# Patient Record
Sex: MALE | Race: WHITE | NOT HISPANIC OR LATINO | Employment: FULL TIME | RURAL
[De-identification: names, ages, dates, MRNs, and addresses within clinical notes are randomized per-mention and may not be internally consistent; named-entity substitution may affect disease eponyms.]

---

## 2018-06-28 LAB — CRC RECOMMENDATION EXT: NORMAL

## 2020-03-16 ENCOUNTER — HISTORICAL (OUTPATIENT)
Dept: ADMINISTRATIVE | Facility: HOSPITAL | Age: 60
End: 2020-03-16

## 2020-03-16 LAB
ALBUMIN SERPL BCP-MCNC: 3.6 G/DL (ref 3.5–5)
ALBUMIN/GLOB SERPL: 0.8 {RATIO}
ALP SERPL-CCNC: 76 U/L (ref 45–115)
ALT SERPL W P-5'-P-CCNC: 48 U/L (ref 16–61)
AST SERPL W P-5'-P-CCNC: 49 U/L (ref 15–37)
BASOPHILS # BLD AUTO: 0.05 X10E3/UL (ref 0–0.2)
BASOPHILS NFR BLD AUTO: 0.4 % (ref 0–1)
BILIRUB SERPL-MCNC: 0.5 MG/DL (ref 0–1.2)
BUN SERPL-MCNC: 23 MG/DL (ref 7–18)
BUN/CREAT SERPL: 20
CALCIUM SERPL-MCNC: 9 MG/DL (ref 8.5–10.1)
CHLORIDE SERPL-SCNC: 104 MMOL/L (ref 98–107)
CO2 SERPL-SCNC: 26 MMOL/L (ref 21–32)
CREAT SERPL-MCNC: 1.16 MG/DL (ref 0.7–1.3)
EOSINOPHIL # BLD AUTO: 0.07 X10E3/UL (ref 0–0.5)
EOSINOPHIL NFR BLD AUTO: 0.6 % (ref 1–4)
ERYTHROCYTE [DISTWIDTH] IN BLOOD BY AUTOMATED COUNT: 14.9 % (ref 11.5–14.5)
GLOBULIN SER-MCNC: 4.5 G/DL (ref 2–4)
GLUCOSE SERPL-MCNC: 106 MG/DL (ref 74–106)
HCT VFR BLD AUTO: 43.9 % (ref 40–54)
HGB BLD-MCNC: 13.9 G/DL (ref 13.5–18)
IMM GRANULOCYTES # BLD AUTO: 0.04 X10E3/UL (ref 0–0.04)
IMM GRANULOCYTES NFR BLD: 0.3 % (ref 0–0.4)
LYMPHOCYTES # BLD AUTO: 1.22 X10E3/UL (ref 1–4.8)
LYMPHOCYTES NFR BLD AUTO: 10.6 % (ref 27–41)
MCH RBC QN AUTO: 29.8 PG (ref 27–31)
MCHC RBC AUTO-ENTMCNC: 31.7 G/DL (ref 32–36)
MCV RBC AUTO: 94 FL (ref 80–96)
MONOCYTES # BLD AUTO: 0.32 X10E3/UL (ref 0–0.8)
MONOCYTES NFR BLD AUTO: 2.8 % (ref 2–6)
MPC BLD CALC-MCNC: 9.9 FL (ref 9.4–12.4)
NEUTROPHILS # BLD AUTO: 9.8 X10E3/UL (ref 1.8–7.7)
NEUTROPHILS NFR BLD AUTO: 85.3 % (ref 53–65)
NRBC # BLD AUTO: 0 X10E3/UL (ref 0–0)
NRBC, AUTO (.00): 0 /100 (ref 0–0)
PLATELET # BLD AUTO: 280 X10E3/UL (ref 150–400)
POTASSIUM SERPL-SCNC: 4.4 MMOL/L (ref 3.5–5.1)
PROT SERPL-MCNC: 8.1 G/DL (ref 6.4–8.2)
RBC # BLD AUTO: 4.67 X10E6/UL (ref 4.6–6.2)
SODIUM SERPL-SCNC: 135 MMOL/L (ref 136–145)
WBC # BLD AUTO: 11.5 X10E3/UL (ref 4.5–11)

## 2020-05-18 ENCOUNTER — HISTORICAL (OUTPATIENT)
Dept: ADMINISTRATIVE | Facility: HOSPITAL | Age: 60
End: 2020-05-18

## 2020-08-21 ENCOUNTER — HISTORICAL (OUTPATIENT)
Dept: ADMINISTRATIVE | Facility: HOSPITAL | Age: 60
End: 2020-08-21

## 2020-08-22 LAB
ALT SERPL W P-5'-P-CCNC: 30 U/L (ref 16–61)
AST SERPL W P-5'-P-CCNC: 26 U/L (ref 15–37)
BASOPHILS # BLD AUTO: 0.08 X10E3/UL (ref 0–0.2)
BASOPHILS NFR BLD AUTO: 0.6 % (ref 0–1)
CREAT SERPL-MCNC: 1.25 MG/DL (ref 0.7–1.3)
EOSINOPHIL # BLD AUTO: 0.21 X10E3/UL (ref 0–0.5)
EOSINOPHIL NFR BLD AUTO: 1.6 % (ref 1–4)
ERYTHROCYTE [DISTWIDTH] IN BLOOD BY AUTOMATED COUNT: 14.8 % (ref 11.5–14.5)
HCT VFR BLD AUTO: 44.3 % (ref 40–54)
HGB BLD-MCNC: 14.6 G/DL (ref 13.5–18)
IMM GRANULOCYTES # BLD AUTO: 0.05 X10E3/UL (ref 0–0.04)
IMM GRANULOCYTES NFR BLD: 0.4 % (ref 0–0.4)
LYMPHOCYTES # BLD AUTO: 2.18 X10E3/UL (ref 1–4.8)
LYMPHOCYTES NFR BLD AUTO: 16.1 % (ref 27–41)
MCH RBC QN AUTO: 32.5 PG (ref 27–31)
MCHC RBC AUTO-ENTMCNC: 33 G/DL (ref 32–36)
MCV RBC AUTO: 98.7 FL (ref 80–96)
MONOCYTES # BLD AUTO: 0.89 X10E3/UL (ref 0–0.8)
MONOCYTES NFR BLD AUTO: 6.6 % (ref 2–6)
MPC BLD CALC-MCNC: 10.4 FL (ref 9.4–12.4)
NEUTROPHILS # BLD AUTO: 10.13 X10E3/UL (ref 1.8–7.7)
NEUTROPHILS NFR BLD AUTO: 74.7 % (ref 53–65)
NRBC # BLD AUTO: 0 X10E3/UL (ref 0–0)
NRBC, AUTO (.00): 0 /100 (ref 0–0)
PLATELET # BLD AUTO: 312 X10E3/UL (ref 150–400)
RBC # BLD AUTO: 4.49 X10E6/UL (ref 4.6–6.2)
WBC # BLD AUTO: 13.54 X10E3/UL (ref 4.5–11)

## 2020-10-15 ENCOUNTER — HISTORICAL (OUTPATIENT)
Dept: ADMINISTRATIVE | Facility: HOSPITAL | Age: 60
End: 2020-10-15

## 2020-10-15 LAB
AMPHET UR QL SCN: NEGATIVE
BARBITURATES UR QL SCN: NEGATIVE
BENZODIAZ METAB UR QL SCN: NEGATIVE
BUPRENORPHINE UR QL SCN: POSITIVE
COCAINE UR QL SCN: NEGATIVE
MDA UR QL SCN: NEGATIVE
METHADONE UR QL SCN: NEGATIVE
METHAMPHET UR QL SCN: NEGATIVE
OPIATES UR QL SCN: NEGATIVE
OXYCODONE UR QL SCN: NEGATIVE
PCP UR QL SCN: NEGATIVE
TEMPERATURE, URINE: 92 (ref 90–100)
THC UR QL SCN: NEGATIVE
VALIDITY TESTS, URINE: ABNORMAL

## 2021-03-24 ENCOUNTER — OFFICE VISIT (OUTPATIENT)
Dept: DERMATOLOGY | Facility: CLINIC | Age: 61
End: 2021-03-24
Payer: COMMERCIAL

## 2021-03-24 VITALS — BODY MASS INDEX: 27.77 KG/M2 | RESPIRATION RATE: 18 BRPM | WEIGHT: 205 LBS | HEIGHT: 72 IN

## 2021-03-24 DIAGNOSIS — L73.2 HIDRADENITIS SUPPURATIVA: Primary | ICD-10-CM

## 2021-03-24 DIAGNOSIS — Z79.899 HIGH RISK MEDICATION USE: ICD-10-CM

## 2021-03-24 PROCEDURE — 99214 OFFICE O/P EST MOD 30 MIN: CPT | Mod: ,,, | Performed by: DERMATOLOGY

## 2021-03-24 PROCEDURE — 99214 PR OFFICE/OUTPT VISIT, EST, LEVL IV, 30-39 MIN: ICD-10-PCS | Mod: ,,, | Performed by: DERMATOLOGY

## 2021-03-24 RX ORDER — FOLIC ACID 1 MG/1
1 TABLET ORAL DAILY
COMMUNITY
Start: 2021-03-16 | End: 2021-06-21 | Stop reason: SDUPTHER

## 2021-03-24 RX ORDER — HYDROXYCHLOROQUINE SULFATE 200 MG/1
200 TABLET, FILM COATED ORAL 2 TIMES DAILY
COMMUNITY
Start: 2021-03-13 | End: 2021-07-12 | Stop reason: SDUPTHER

## 2021-03-24 RX ORDER — BUPRENORPHINE AND NALOXONE 8; 2 MG/1; MG/1
0.5 FILM, SOLUBLE BUCCAL; SUBLINGUAL DAILY
COMMUNITY
Start: 2021-03-17

## 2021-03-24 RX ORDER — PREDNISONE 5 MG/1
5 TABLET ORAL DAILY
COMMUNITY
Start: 2021-03-06 | End: 2022-09-07 | Stop reason: SDUPTHER

## 2021-03-24 RX ORDER — ADALIMUMAB 40MG/0.4ML
KIT SUBCUTANEOUS
COMMUNITY
Start: 2021-03-10

## 2021-03-24 RX ORDER — METHOTREXATE 2.5 MG/1
TABLET ORAL
COMMUNITY
Start: 2021-03-16 | End: 2022-04-04 | Stop reason: SDUPTHER

## 2021-03-24 RX ORDER — OMEPRAZOLE 20 MG/1
20 CAPSULE, DELAYED RELEASE ORAL DAILY
COMMUNITY
Start: 2021-03-17

## 2021-03-24 RX ORDER — AMLODIPINE AND BENAZEPRIL HYDROCHLORIDE 10; 40 MG/1; MG/1
1 CAPSULE ORAL DAILY
COMMUNITY
Start: 2021-03-13 | End: 2021-08-25

## 2021-03-24 RX ORDER — MUPIROCIN 20 MG/G
OINTMENT TOPICAL
Qty: 30 G | Refills: 2 | Status: SHIPPED | OUTPATIENT
Start: 2021-03-24 | End: 2022-04-04 | Stop reason: ALTCHOICE

## 2021-03-24 RX ORDER — TAMSULOSIN HYDROCHLORIDE 0.4 MG/1
1 CAPSULE ORAL DAILY
COMMUNITY
Start: 2021-03-13 | End: 2021-08-25

## 2021-05-24 ENCOUNTER — TELEPHONE (OUTPATIENT)
Dept: FAMILY MEDICINE | Facility: CLINIC | Age: 61
End: 2021-05-24

## 2021-06-18 RX ORDER — GABAPENTIN 300 MG/1
300 CAPSULE ORAL 2 TIMES DAILY
COMMUNITY
End: 2021-06-21 | Stop reason: SDUPTHER

## 2021-06-18 RX ORDER — ACETAMINOPHEN 500 MG
500-1000 TABLET ORAL EVERY 6 HOURS PRN
COMMUNITY

## 2021-06-21 ENCOUNTER — OFFICE VISIT (OUTPATIENT)
Dept: FAMILY MEDICINE | Facility: CLINIC | Age: 61
End: 2021-06-21
Payer: COMMERCIAL

## 2021-06-21 VITALS
HEART RATE: 101 BPM | RESPIRATION RATE: 16 BRPM | OXYGEN SATURATION: 91 % | SYSTOLIC BLOOD PRESSURE: 108 MMHG | WEIGHT: 219.38 LBS | TEMPERATURE: 98 F | BODY MASS INDEX: 29.76 KG/M2 | DIASTOLIC BLOOD PRESSURE: 66 MMHG

## 2021-06-21 DIAGNOSIS — L73.2 HIDRADENITIS SUPPURATIVA: ICD-10-CM

## 2021-06-21 DIAGNOSIS — I10 ESSENTIAL HYPERTENSION: ICD-10-CM

## 2021-06-21 DIAGNOSIS — M06.9 RHEUMATOID ARTHRITIS INVOLVING MULTIPLE SITES, UNSPECIFIED WHETHER RHEUMATOID FACTOR PRESENT: Primary | ICD-10-CM

## 2021-06-21 PROCEDURE — 1126F PR PAIN SEVERITY QUANTIFIED, NO PAIN PRESENT: ICD-10-PCS | Mod: ,,, | Performed by: INTERNAL MEDICINE

## 2021-06-21 PROCEDURE — 99213 PR OFFICE/OUTPT VISIT, EST, LEVL III, 20-29 MIN: ICD-10-PCS | Mod: ,,, | Performed by: INTERNAL MEDICINE

## 2021-06-21 PROCEDURE — 99213 OFFICE O/P EST LOW 20 MIN: CPT | Mod: ,,, | Performed by: INTERNAL MEDICINE

## 2021-06-21 PROCEDURE — 3008F PR BODY MASS INDEX (BMI) DOCUMENTED: ICD-10-PCS | Mod: CPTII,,, | Performed by: INTERNAL MEDICINE

## 2021-06-21 PROCEDURE — 1126F AMNT PAIN NOTED NONE PRSNT: CPT | Mod: ,,, | Performed by: INTERNAL MEDICINE

## 2021-06-21 PROCEDURE — 3008F BODY MASS INDEX DOCD: CPT | Mod: CPTII,,, | Performed by: INTERNAL MEDICINE

## 2021-06-21 RX ORDER — GABAPENTIN 300 MG/1
300 CAPSULE ORAL 2 TIMES DAILY
Qty: 180 CAPSULE | Refills: 1 | Status: SHIPPED | OUTPATIENT
Start: 2021-06-21 | End: 2021-08-25

## 2021-06-21 RX ORDER — FOLIC ACID 1 MG/1
1 TABLET ORAL DAILY
Qty: 90 TABLET | Refills: 1 | Status: SHIPPED | OUTPATIENT
Start: 2021-06-21 | End: 2021-08-25

## 2021-07-12 ENCOUNTER — OFFICE VISIT (OUTPATIENT)
Dept: FAMILY MEDICINE | Facility: CLINIC | Age: 61
End: 2021-07-12
Payer: COMMERCIAL

## 2021-07-12 ENCOUNTER — HOSPITAL ENCOUNTER (OUTPATIENT)
Dept: RADIOLOGY | Facility: HOSPITAL | Age: 61
Discharge: HOME | End: 2021-07-12
Attending: INTERNAL MEDICINE
Payer: COMMERCIAL

## 2021-07-12 VITALS
BODY MASS INDEX: 29.97 KG/M2 | RESPIRATION RATE: 16 BRPM | TEMPERATURE: 98 F | DIASTOLIC BLOOD PRESSURE: 62 MMHG | HEART RATE: 104 BPM | SYSTOLIC BLOOD PRESSURE: 110 MMHG | OXYGEN SATURATION: 91 % | WEIGHT: 221 LBS

## 2021-07-12 DIAGNOSIS — G89.29 CHRONIC PAIN OF RIGHT KNEE: ICD-10-CM

## 2021-07-12 DIAGNOSIS — M25.561 CHRONIC PAIN OF RIGHT KNEE: ICD-10-CM

## 2021-07-12 DIAGNOSIS — M06.9 RHEUMATOID ARTHRITIS INVOLVING MULTIPLE SITES, UNSPECIFIED WHETHER RHEUMATOID FACTOR PRESENT: Primary | ICD-10-CM

## 2021-07-12 PROCEDURE — 1126F PR PAIN SEVERITY QUANTIFIED, NO PAIN PRESENT: ICD-10-PCS | Mod: ,,, | Performed by: INTERNAL MEDICINE

## 2021-07-12 PROCEDURE — 3008F BODY MASS INDEX DOCD: CPT | Mod: CPTII,,, | Performed by: INTERNAL MEDICINE

## 2021-07-12 PROCEDURE — 99214 OFFICE O/P EST MOD 30 MIN: CPT | Mod: ,,, | Performed by: INTERNAL MEDICINE

## 2021-07-12 PROCEDURE — 3008F PR BODY MASS INDEX (BMI) DOCUMENTED: ICD-10-PCS | Mod: CPTII,,, | Performed by: INTERNAL MEDICINE

## 2021-07-12 PROCEDURE — 73560 X-RAY EXAM OF KNEE 1 OR 2: CPT | Mod: TC,RT

## 2021-07-12 PROCEDURE — 99214 PR OFFICE/OUTPT VISIT, EST, LEVL IV, 30-39 MIN: ICD-10-PCS | Mod: ,,, | Performed by: INTERNAL MEDICINE

## 2021-07-12 PROCEDURE — 1126F AMNT PAIN NOTED NONE PRSNT: CPT | Mod: ,,, | Performed by: INTERNAL MEDICINE

## 2021-07-12 RX ORDER — HYDROXYCHLOROQUINE SULFATE 200 MG/1
200 TABLET, FILM COATED ORAL 2 TIMES DAILY
Qty: 180 TABLET | Refills: 1 | Status: SHIPPED | OUTPATIENT
Start: 2021-07-12 | End: 2021-10-05

## 2021-07-26 PROBLEM — M17.11 ARTHRITIS OF RIGHT KNEE: Status: ACTIVE | Noted: 2021-07-26

## 2021-09-23 ENCOUNTER — HOSPITAL ENCOUNTER (OUTPATIENT)
Dept: RADIOLOGY | Facility: HOSPITAL | Age: 61
Discharge: HOME | End: 2021-09-23
Attending: INTERNAL MEDICINE
Payer: COMMERCIAL

## 2021-09-23 ENCOUNTER — OFFICE VISIT (OUTPATIENT)
Dept: FAMILY MEDICINE | Facility: CLINIC | Age: 61
End: 2021-09-23
Payer: COMMERCIAL

## 2021-09-23 VITALS
BODY MASS INDEX: 29.7 KG/M2 | OXYGEN SATURATION: 94 % | HEART RATE: 96 BPM | TEMPERATURE: 98 F | SYSTOLIC BLOOD PRESSURE: 114 MMHG | DIASTOLIC BLOOD PRESSURE: 62 MMHG | RESPIRATION RATE: 16 BRPM | WEIGHT: 219 LBS

## 2021-09-23 DIAGNOSIS — Z01.818 PREOPERATIVE CLEARANCE: Primary | ICD-10-CM

## 2021-09-23 DIAGNOSIS — M06.9 RHEUMATOID ARTHRITIS INVOLVING MULTIPLE SITES, UNSPECIFIED WHETHER RHEUMATOID FACTOR PRESENT: ICD-10-CM

## 2021-09-23 DIAGNOSIS — Z01.818 PREOPERATIVE CLEARANCE: ICD-10-CM

## 2021-09-23 LAB
ANION GAP SERPL CALCULATED.3IONS-SCNC: 13 MMOL/L (ref 7–16)
BASOPHILS # BLD AUTO: 0.07 K/UL (ref 0–0.2)
BASOPHILS NFR BLD AUTO: 0.6 % (ref 0–1)
BUN SERPL-MCNC: 27 MG/DL (ref 7–18)
BUN/CREAT SERPL: 18 (ref 6–20)
CALCIUM SERPL-MCNC: 9.4 MG/DL (ref 8.5–10.1)
CHLORIDE SERPL-SCNC: 104 MMOL/L (ref 98–107)
CO2 SERPL-SCNC: 24 MMOL/L (ref 21–32)
CREAT SERPL-MCNC: 1.54 MG/DL (ref 0.7–1.3)
DIFFERENTIAL METHOD BLD: ABNORMAL
EOSINOPHIL # BLD AUTO: 0.13 K/UL (ref 0–0.5)
EOSINOPHIL NFR BLD AUTO: 1.2 % (ref 1–4)
ERYTHROCYTE [DISTWIDTH] IN BLOOD BY AUTOMATED COUNT: 14.5 % (ref 11.5–14.5)
GLUCOSE SERPL-MCNC: 103 MG/DL (ref 74–106)
HCT VFR BLD AUTO: 44.1 % (ref 40–54)
HGB BLD-MCNC: 14.7 G/DL (ref 13.5–18)
IMM GRANULOCYTES # BLD AUTO: 0.04 K/UL (ref 0–0.04)
IMM GRANULOCYTES NFR BLD: 0.4 % (ref 0–0.4)
LYMPHOCYTES # BLD AUTO: 1.71 K/UL (ref 1–4.8)
LYMPHOCYTES NFR BLD AUTO: 15.4 % (ref 27–41)
MCH RBC QN AUTO: 32.2 PG (ref 27–31)
MCHC RBC AUTO-ENTMCNC: 33.3 G/DL (ref 32–36)
MCV RBC AUTO: 96.7 FL (ref 80–96)
MONOCYTES # BLD AUTO: 0.61 K/UL (ref 0–0.8)
MONOCYTES NFR BLD AUTO: 5.5 % (ref 2–6)
MPC BLD CALC-MCNC: 10.4 FL (ref 9.4–12.4)
NEUTROPHILS # BLD AUTO: 8.55 K/UL (ref 1.8–7.7)
NEUTROPHILS NFR BLD AUTO: 76.9 % (ref 53–65)
NRBC # BLD AUTO: 0 X10E3/UL
NRBC, AUTO (.00): 0 %
PLATELET # BLD AUTO: 272 K/UL (ref 150–400)
POTASSIUM SERPL-SCNC: 4.8 MMOL/L (ref 3.5–5.1)
RBC # BLD AUTO: 4.56 M/UL (ref 4.6–6.2)
SODIUM SERPL-SCNC: 136 MMOL/L (ref 136–145)
WBC # BLD AUTO: 11.11 K/UL (ref 4.5–11)

## 2021-09-23 PROCEDURE — 3008F BODY MASS INDEX DOCD: CPT | Mod: CPTII,,, | Performed by: INTERNAL MEDICINE

## 2021-09-23 PROCEDURE — 1159F PR MEDICATION LIST DOCUMENTED IN MEDICAL RECORD: ICD-10-PCS | Mod: CPTII,,, | Performed by: INTERNAL MEDICINE

## 2021-09-23 PROCEDURE — 1160F RVW MEDS BY RX/DR IN RCRD: CPT | Mod: CPTII,,, | Performed by: INTERNAL MEDICINE

## 2021-09-23 PROCEDURE — 3078F DIAST BP <80 MM HG: CPT | Mod: CPTII,,, | Performed by: INTERNAL MEDICINE

## 2021-09-23 PROCEDURE — 4010F PR ACE/ARB THEARPY RXD/TAKEN: ICD-10-PCS | Mod: CPTII,,, | Performed by: INTERNAL MEDICINE

## 2021-09-23 PROCEDURE — 3008F PR BODY MASS INDEX (BMI) DOCUMENTED: ICD-10-PCS | Mod: CPTII,,, | Performed by: INTERNAL MEDICINE

## 2021-09-23 PROCEDURE — 85025 CBC WITH DIFFERENTIAL: ICD-10-PCS | Mod: ,,, | Performed by: CLINICAL MEDICAL LABORATORY

## 2021-09-23 PROCEDURE — 4010F ACE/ARB THERAPY RXD/TAKEN: CPT | Mod: CPTII,,, | Performed by: INTERNAL MEDICINE

## 2021-09-23 PROCEDURE — 3074F SYST BP LT 130 MM HG: CPT | Mod: CPTII,,, | Performed by: INTERNAL MEDICINE

## 2021-09-23 PROCEDURE — 85730 THROMBOPLASTIN TIME PARTIAL: CPT | Performed by: INTERNAL MEDICINE

## 2021-09-23 PROCEDURE — 1160F PR REVIEW ALL MEDS BY PRESCRIBER/CLIN PHARMACIST DOCUMENTED: ICD-10-PCS | Mod: CPTII,,, | Performed by: INTERNAL MEDICINE

## 2021-09-23 PROCEDURE — 99214 PR OFFICE/OUTPT VISIT, EST, LEVL IV, 30-39 MIN: ICD-10-PCS | Mod: ,,, | Performed by: INTERNAL MEDICINE

## 2021-09-23 PROCEDURE — 85610 PROTHROMBIN TIME: CPT | Performed by: INTERNAL MEDICINE

## 2021-09-23 PROCEDURE — 3074F PR MOST RECENT SYSTOLIC BLOOD PRESSURE < 130 MM HG: ICD-10-PCS | Mod: CPTII,,, | Performed by: INTERNAL MEDICINE

## 2021-09-23 PROCEDURE — 80048 BASIC METABOLIC PNL TOTAL CA: CPT | Mod: ,,, | Performed by: CLINICAL MEDICAL LABORATORY

## 2021-09-23 PROCEDURE — 3078F PR MOST RECENT DIASTOLIC BLOOD PRESSURE < 80 MM HG: ICD-10-PCS | Mod: CPTII,,, | Performed by: INTERNAL MEDICINE

## 2021-09-23 PROCEDURE — 99214 OFFICE O/P EST MOD 30 MIN: CPT | Mod: ,,, | Performed by: INTERNAL MEDICINE

## 2021-09-23 PROCEDURE — 71046 X-RAY EXAM CHEST 2 VIEWS: CPT | Mod: TC

## 2021-09-23 PROCEDURE — 85025 COMPLETE CBC W/AUTO DIFF WBC: CPT | Mod: ,,, | Performed by: CLINICAL MEDICAL LABORATORY

## 2021-09-23 PROCEDURE — 80048 BASIC METABOLIC PANEL: ICD-10-PCS | Mod: ,,, | Performed by: CLINICAL MEDICAL LABORATORY

## 2021-09-23 PROCEDURE — 1159F MED LIST DOCD IN RCRD: CPT | Mod: CPTII,,, | Performed by: INTERNAL MEDICINE

## 2021-09-23 RX ORDER — GABAPENTIN 300 MG/1
300 CAPSULE ORAL 2 TIMES DAILY
Qty: 180 CAPSULE | Refills: 1 | Status: CANCELLED | OUTPATIENT
Start: 2021-09-23

## 2021-09-24 LAB
APTT PPP: 29.5 SECONDS (ref 25.2–37.3)
INR BLD: 0.95 (ref 0.9–1.1)
PROTHROMBIN TIME: 12.7 SECONDS (ref 11.7–14.7)

## 2021-11-11 DIAGNOSIS — M06.9 RHEUMATOID ARTHRITIS INVOLVING MULTIPLE SITES, UNSPECIFIED WHETHER RHEUMATOID FACTOR PRESENT: ICD-10-CM

## 2021-11-11 RX ORDER — GABAPENTIN 300 MG/1
300 CAPSULE ORAL 2 TIMES DAILY
Qty: 180 CAPSULE | Refills: 1 | Status: SHIPPED | OUTPATIENT
Start: 2021-11-11 | End: 2022-09-07 | Stop reason: SDUPTHER

## 2021-12-22 ENCOUNTER — IMMUNIZATION (OUTPATIENT)
Dept: FAMILY MEDICINE | Facility: CLINIC | Age: 61
End: 2021-12-22
Payer: COMMERCIAL

## 2021-12-22 DIAGNOSIS — Z23 NEED FOR VACCINATION: Primary | ICD-10-CM

## 2021-12-22 PROCEDURE — 0064A COVID-19, MRNA, LNP-S, PF, 100 MCG/0.25 ML DOSE VACCINE (MODERNA BOOSTER): ICD-10-PCS | Mod: ,,, | Performed by: FAMILY MEDICINE

## 2021-12-22 PROCEDURE — 0064A COVID-19, MRNA, LNP-S, PF, 100 MCG/0.25 ML DOSE VACCINE (MODERNA BOOSTER): CPT | Mod: ,,, | Performed by: FAMILY MEDICINE

## 2021-12-22 PROCEDURE — 91306 COVID-19, MRNA, LNP-S, PF, 100 MCG/0.25 ML DOSE VACCINE (MODERNA BOOSTER): ICD-10-PCS | Mod: ,,, | Performed by: FAMILY MEDICINE

## 2021-12-22 PROCEDURE — 91306 COVID-19, MRNA, LNP-S, PF, 100 MCG/0.25 ML DOSE VACCINE (MODERNA BOOSTER): CPT | Mod: ,,, | Performed by: FAMILY MEDICINE

## 2022-04-04 ENCOUNTER — OFFICE VISIT (OUTPATIENT)
Dept: FAMILY MEDICINE | Facility: CLINIC | Age: 62
End: 2022-04-04
Payer: COMMERCIAL

## 2022-04-04 VITALS
WEIGHT: 215.63 LBS | HEART RATE: 98 BPM | TEMPERATURE: 97 F | SYSTOLIC BLOOD PRESSURE: 114 MMHG | DIASTOLIC BLOOD PRESSURE: 60 MMHG | RESPIRATION RATE: 16 BRPM | OXYGEN SATURATION: 93 % | BODY MASS INDEX: 29.24 KG/M2

## 2022-04-04 DIAGNOSIS — N40.0 BENIGN PROSTATIC HYPERPLASIA, UNSPECIFIED WHETHER LOWER URINARY TRACT SYMPTOMS PRESENT: ICD-10-CM

## 2022-04-04 DIAGNOSIS — R53.82 CHRONIC FATIGUE: ICD-10-CM

## 2022-04-04 DIAGNOSIS — M06.9 RHEUMATOID ARTHRITIS INVOLVING MULTIPLE SITES, UNSPECIFIED WHETHER RHEUMATOID FACTOR PRESENT: Primary | ICD-10-CM

## 2022-04-04 DIAGNOSIS — Z79.899 LONG-TERM USE OF HIGH-RISK MEDICATION: ICD-10-CM

## 2022-04-04 DIAGNOSIS — Z23 ENCOUNTER FOR IMMUNIZATION: ICD-10-CM

## 2022-04-04 DIAGNOSIS — I10 PRIMARY HYPERTENSION: ICD-10-CM

## 2022-04-04 DIAGNOSIS — Z11.59 ENCOUNTER FOR HEPATITIS C SCREENING TEST FOR LOW RISK PATIENT: ICD-10-CM

## 2022-04-04 LAB
ALBUMIN SERPL BCP-MCNC: 3.9 G/DL (ref 3.5–5)
ALP SERPL-CCNC: 72 U/L (ref 45–115)
ALT SERPL W P-5'-P-CCNC: 24 U/L (ref 16–61)
AST SERPL W P-5'-P-CCNC: 22 U/L (ref 15–37)
BASOPHILS # BLD AUTO: 0.07 K/UL (ref 0–0.2)
BASOPHILS NFR BLD AUTO: 0.6 % (ref 0–1)
BILIRUB DIRECT SERPL-MCNC: 0.2 MG/DL (ref 0–0.2)
BILIRUB SERPL-MCNC: 0.5 MG/DL (ref 0–1.2)
DIFFERENTIAL METHOD BLD: ABNORMAL
EOSINOPHIL # BLD AUTO: 0.19 K/UL (ref 0–0.5)
EOSINOPHIL NFR BLD AUTO: 1.6 % (ref 1–4)
ERYTHROCYTE [DISTWIDTH] IN BLOOD BY AUTOMATED COUNT: 14.6 % (ref 11.5–14.5)
HCT VFR BLD AUTO: 46.3 % (ref 40–54)
HCV AB SER QL: NORMAL
HGB BLD-MCNC: 14.8 G/DL (ref 13.5–18)
IMM GRANULOCYTES # BLD AUTO: 0.03 K/UL (ref 0–0.04)
IMM GRANULOCYTES NFR BLD: 0.3 % (ref 0–0.4)
LYMPHOCYTES # BLD AUTO: 1.34 K/UL (ref 1–4.8)
LYMPHOCYTES NFR BLD AUTO: 11.5 % (ref 27–41)
MCH RBC QN AUTO: 32.2 PG (ref 27–31)
MCHC RBC AUTO-ENTMCNC: 32 G/DL (ref 32–36)
MCV RBC AUTO: 100.7 FL (ref 80–96)
MONOCYTES # BLD AUTO: 0.75 K/UL (ref 0–0.8)
MONOCYTES NFR BLD AUTO: 6.4 % (ref 2–6)
MPC BLD CALC-MCNC: 10.7 FL (ref 9.4–12.4)
NEUTROPHILS # BLD AUTO: 9.29 K/UL (ref 1.8–7.7)
NEUTROPHILS NFR BLD AUTO: 79.6 % (ref 53–65)
NRBC # BLD AUTO: 0 X10E3/UL
NRBC, AUTO (.00): 0 %
PLATELET # BLD AUTO: 246 K/UL (ref 150–400)
PROT SERPL-MCNC: 7.6 G/DL (ref 6.4–8.2)
RBC # BLD AUTO: 4.6 M/UL (ref 4.6–6.2)
TSH SERPL DL<=0.005 MIU/L-ACNC: 0.53 UIU/ML (ref 0.36–3.74)
WBC # BLD AUTO: 11.67 K/UL (ref 4.5–11)

## 2022-04-04 PROCEDURE — 80076 HEPATIC FUNCTION PANEL: CPT | Mod: ,,, | Performed by: CLINICAL MEDICAL LABORATORY

## 2022-04-04 PROCEDURE — 80076 HEPATIC FUNCTION PANEL: ICD-10-PCS | Mod: ,,, | Performed by: CLINICAL MEDICAL LABORATORY

## 2022-04-04 PROCEDURE — 85025 COMPLETE CBC W/AUTO DIFF WBC: CPT | Mod: ,,, | Performed by: CLINICAL MEDICAL LABORATORY

## 2022-04-04 PROCEDURE — 99214 PR OFFICE/OUTPT VISIT, EST, LEVL IV, 30-39 MIN: ICD-10-PCS | Mod: 25,,, | Performed by: INTERNAL MEDICINE

## 2022-04-04 PROCEDURE — 90732 PPSV23 VACC 2 YRS+ SUBQ/IM: CPT | Mod: ,,, | Performed by: INTERNAL MEDICINE

## 2022-04-04 PROCEDURE — 90471 PR IMMUNIZ ADMIN,1 SINGLE/COMB VAC/TOXOID: ICD-10-PCS | Mod: ,,, | Performed by: INTERNAL MEDICINE

## 2022-04-04 PROCEDURE — 85025 CBC WITH DIFFERENTIAL: ICD-10-PCS | Mod: ,,, | Performed by: CLINICAL MEDICAL LABORATORY

## 2022-04-04 PROCEDURE — 86803 HEPATITIS C AB TEST: CPT | Mod: ,,, | Performed by: CLINICAL MEDICAL LABORATORY

## 2022-04-04 PROCEDURE — 90732 PR PNEUMOCOCCAL VACCINE,23-VALENT,ADULT: ICD-10-PCS | Mod: ,,, | Performed by: INTERNAL MEDICINE

## 2022-04-04 PROCEDURE — 84443 ASSAY THYROID STIM HORMONE: CPT | Mod: ,,, | Performed by: CLINICAL MEDICAL LABORATORY

## 2022-04-04 PROCEDURE — 84443 TSH: ICD-10-PCS | Mod: ,,, | Performed by: CLINICAL MEDICAL LABORATORY

## 2022-04-04 PROCEDURE — 99214 OFFICE O/P EST MOD 30 MIN: CPT | Mod: 25,,, | Performed by: INTERNAL MEDICINE

## 2022-04-04 PROCEDURE — 90471 IMMUNIZATION ADMIN: CPT | Mod: ,,, | Performed by: INTERNAL MEDICINE

## 2022-04-04 PROCEDURE — 86803 HEPATITIS C ANTIBODY: ICD-10-PCS | Mod: ,,, | Performed by: CLINICAL MEDICAL LABORATORY

## 2022-04-04 RX ORDER — METHOTREXATE 2.5 MG/1
TABLET ORAL
Qty: 104 TABLET | Refills: 1 | Status: SHIPPED | OUTPATIENT
Start: 2022-04-04 | End: 2022-10-11 | Stop reason: SDUPTHER

## 2022-04-04 NOTE — PROGRESS NOTES
New Clinic Note    Patient Name:  Shamar Mendez is a 61 y.o. male     Chief Complaint:    Chief Complaint   Patient presents with    Follow-up     Patient is here for his checkup today.     Rheumatoid Arthritis     His rheumatologist, Dr. Danial Dawn, has moved to another clinic. Patient has had a hard time getting in touch with him to refill his Methotrexate. He just ran out of it but he missed his weekly dose yesterday. He asks if Dr. Puga can refill that today. He has a rheumatology appointment but it is not until 6/28.    Did not bring meds    Urinary Hesitancy     He reports urinary hesitancy at night. Denies any pain or burning with urination. He's had an enlarged prostate and he takes Flomax, but he states that the hesitancy has worsened recently.         Subjective  HPI         Current Outpatient Medications:     acetaminophen (TYLENOL) 500 MG tablet, Take 500-1,000 mg by mouth every 6 (six) hours as needed for Pain., Disp: , Rfl:     amLODIPine-benazepriL (LOTREL) 10-40 mg per capsule, TAKE 1 CAPSULE DAILY, Disp: 90 capsule, Rfl: 3    buprenorphine-naloxone (SUBOXONE) 8-2 mg Film, Place 0.5 each under the tongue once daily. , Disp: , Rfl:     folic acid (FOLVITE) 1 MG tablet, TAKE 1 TABLET DAILY, Disp: 90 tablet, Rfl: 3    gabapentin (NEURONTIN) 300 MG capsule, Take 1 capsule (300 mg total) by mouth 2 (two) times daily., Disp: 180 capsule, Rfl: 1    HUMIRA,CF, PEN 40 mg/0.4 mL PnKt, , Disp: , Rfl:     hydrOXYchloroQUINE (PLAQUENIL) 200 mg tablet, Take 1 tablet (200 mg total) by mouth 2 (two) times daily., Disp: 180 tablet, Rfl: 1    methotrexate 2.5 MG Tab, Take 8 tablets by mouth weekly, Disp: 104 tablet, Rfl: 1    omeprazole (PRILOSEC) 20 MG capsule, Take 20 mg by mouth once daily. , Disp: , Rfl:     predniSONE (DELTASONE) 5 MG tablet, Take 5 mg by mouth once daily. With food., Disp: , Rfl:     tamsulosin (FLOMAX) 0.4 mg Cap, TAKE 1 CAPSULE DAILY, Disp: 90 capsule, Rfl: 3  No current  facility-administered medications for this visit.   Past Medical History:   Diagnosis Date    Arthritis     BPH (benign prostatic hyperplasia)     Carpal tunnel syndrome, bilateral     Hidradenitis suppurativa     History of knee replacement     Hypertension     Opioid dependence     Rheumatoid arthritis       Past Surgical History:   Procedure Laterality Date    CARPAL TUNNEL RELEASE Bilateral     KNEE ARTHROSCOPY Left     NECK SURGERY      TONSILLECTOMY      TOTAL KNEE ARTHROPLASTY Left       Family History   Problem Relation Age of Onset    Heart disease Mother     Hypertension Mother     Diabetes Father     Heart disease Father     Diabetes Maternal Grandmother     Diabetes Maternal Grandfather     Diabetes Paternal Grandfather     Melanoma Neg Hx       Social History     Tobacco Use    Smoking status: Current Every Day Smoker    Smokeless tobacco: Former User   Substance Use Topics    Alcohol use: Never        Review of Systems   Constitutional: Positive for fatigue. Negative for fever.   HENT: Negative for nasal congestion and sore throat.    Respiratory: Negative for cough, shortness of breath and wheezing.    Cardiovascular: Negative for chest pain and palpitations.   Gastrointestinal: Negative for abdominal pain and blood in stool.   Genitourinary: Negative for dysuria.   Musculoskeletal: Positive for arthralgias. Negative for back pain and neck pain.   Integumentary:  Negative for rash and mole/lesion.   Neurological: Negative for dizziness, headaches and memory loss.   Psychiatric/Behavioral: Negative for agitation. The patient is not nervous/anxious.         Objective:  /60 (BP Location: Left arm, Patient Position: Sitting)   Pulse 98   Temp 96.8 °F (36 °C) (Temporal)   Resp 16   Wt 97.8 kg (215 lb 9.6 oz)   SpO2 (!) 93%   BMI 29.24 kg/m²      Physical Exam  Constitutional:       Appearance: Normal appearance.   HENT:      Head: Normocephalic and atraumatic.      Right  Ear: External ear normal.      Left Ear: External ear normal.      Nose: Nose normal.   Eyes:      Extraocular Movements: Extraocular movements intact.      Conjunctiva/sclera: Conjunctivae normal.      Pupils: Pupils are equal, round, and reactive to light.   Cardiovascular:      Rate and Rhythm: Normal rate and regular rhythm.      Pulses: Normal pulses.      Heart sounds: Normal heart sounds. No murmur heard.    No friction rub. No gallop.   Pulmonary:      Effort: Pulmonary effort is normal.      Breath sounds: No wheezing, rhonchi or rales.   Abdominal:      General: Abdomen is flat.      Palpations: Abdomen is soft.   Musculoskeletal:      Cervical back: Normal range of motion and neck supple.      Right lower leg: No edema.      Left lower leg: No edema.      Comments: Ulnar deviation at bilateral wrists   Skin:     General: Skin is warm and dry.      Findings: No rash.   Neurological:      General: No focal deficit present.      Mental Status: He is alert and oriented to person, place, and time.      Cranial Nerves: No cranial nerve deficit.   Psychiatric:         Mood and Affect: Mood normal.          Assessment and Plan    Rheumatoid arthritis involving multiple sites, unspecified whether rheumatoid factor present  -     methotrexate 2.5 MG Tab; Take 8 tablets by mouth weekly  Dispense: 104 tablet; Refill: 1    Chronic fatigue  -     TSH; Future; Expected date: 04/04/2022    Long-term use of high-risk medication  -     CBC Auto Differential; Future; Expected date: 04/04/2022  -     Hepatic Function Panel; Future; Expected date: 04/04/2022    Encounter for hepatitis C screening test for low risk patient  -     Hepatitis C Antibody; Future; Expected date: 04/04/2022    Primary hypertension    Benign prostatic hyperplasia, unspecified whether lower urinary tract symptoms present    Encounter for immunization  -     pneumococcal vaccine (PNU-IMMUNE 23) injection 0.5 mL         Problem List Items Addressed This  Visit        Cardiac/Vascular    Hypertension       Renal/    BPH (benign prostatic hyperplasia)       Orthopedic    Rheumatoid arthritis - Primary    Relevant Medications    methotrexate 2.5 MG Tab      Other Visit Diagnoses     Chronic fatigue        Relevant Orders    TSH    Long-term use of high-risk medication        Relevant Orders    CBC Auto Differential    Hepatic Function Panel    Encounter for hepatitis C screening test for low risk patient        Relevant Orders    Hepatitis C Antibody    Encounter for immunization        Relevant Medications    pneumococcal vaccine (PNU-IMMUNE 23) injection 0.5 mL (Completed) (Start on 4/4/2022 10:30 AM)       1-Labs for RA and being on MTX  2-Fatigue-check tsh.  I think it may be more related to RA  3-BPH-try bid dosing of flomax, if no better in 1 week he may need to see Urology  Pneumovax today.    Follow up in about 6 months (around 10/4/2022).

## 2022-06-07 DIAGNOSIS — M06.9 RHEUMATOID ARTHRITIS INVOLVING MULTIPLE SITES, UNSPECIFIED WHETHER RHEUMATOID FACTOR PRESENT: ICD-10-CM

## 2022-06-07 RX ORDER — HYDROXYCHLOROQUINE SULFATE 200 MG/1
200 TABLET, FILM COATED ORAL 2 TIMES DAILY
Qty: 180 TABLET | Refills: 1 | Status: SHIPPED | OUTPATIENT
Start: 2022-06-07 | End: 2022-12-02 | Stop reason: SDUPTHER

## 2022-09-07 ENCOUNTER — OFFICE VISIT (OUTPATIENT)
Dept: FAMILY MEDICINE | Facility: CLINIC | Age: 62
End: 2022-09-07
Payer: COMMERCIAL

## 2022-09-07 VITALS
DIASTOLIC BLOOD PRESSURE: 60 MMHG | RESPIRATION RATE: 16 BRPM | WEIGHT: 214.19 LBS | TEMPERATURE: 97 F | HEART RATE: 102 BPM | SYSTOLIC BLOOD PRESSURE: 102 MMHG | OXYGEN SATURATION: 92 % | HEIGHT: 72 IN | BODY MASS INDEX: 29.01 KG/M2

## 2022-09-07 DIAGNOSIS — I10 PRIMARY HYPERTENSION: Primary | ICD-10-CM

## 2022-09-07 DIAGNOSIS — L98.9 SKIN LESION: ICD-10-CM

## 2022-09-07 DIAGNOSIS — Z12.5 SCREENING PSA (PROSTATE SPECIFIC ANTIGEN): ICD-10-CM

## 2022-09-07 DIAGNOSIS — R39.12 BENIGN PROSTATIC HYPERPLASIA WITH WEAK URINARY STREAM: ICD-10-CM

## 2022-09-07 DIAGNOSIS — M17.11 ARTHRITIS OF RIGHT KNEE: ICD-10-CM

## 2022-09-07 DIAGNOSIS — M06.9 RHEUMATOID ARTHRITIS INVOLVING MULTIPLE SITES, UNSPECIFIED WHETHER RHEUMATOID FACTOR PRESENT: ICD-10-CM

## 2022-09-07 DIAGNOSIS — N40.1 BENIGN PROSTATIC HYPERPLASIA WITH WEAK URINARY STREAM: ICD-10-CM

## 2022-09-07 LAB
CHOLEST SERPL-MCNC: 137 MG/DL (ref 0–200)
CHOLEST/HDLC SERPL: 2.5 {RATIO}
HDLC SERPL-MCNC: 55 MG/DL (ref 40–60)
LDLC SERPL CALC-MCNC: 60 MG/DL
LDLC/HDLC SERPL: 1.1 {RATIO}
NONHDLC SERPL-MCNC: 82 MG/DL
PSA SERPL-MCNC: 0.19 NG/ML (ref 0–4.1)
TRIGL SERPL-MCNC: 108 MG/DL (ref 35–150)
VLDLC SERPL-MCNC: 22 MG/DL

## 2022-09-07 PROCEDURE — G0103 PSA SCREENING: HCPCS | Mod: ,,, | Performed by: CLINICAL MEDICAL LABORATORY

## 2022-09-07 PROCEDURE — G0103 PSA, SCREENING: ICD-10-PCS | Mod: ,,, | Performed by: CLINICAL MEDICAL LABORATORY

## 2022-09-07 PROCEDURE — 17110 PR DESTRUCTION BENIGN LESIONS UP TO 14: ICD-10-PCS | Mod: ,,, | Performed by: INTERNAL MEDICINE

## 2022-09-07 PROCEDURE — 17110 DESTRUCTION B9 LES UP TO 14: CPT | Mod: ,,, | Performed by: INTERNAL MEDICINE

## 2022-09-07 PROCEDURE — 99214 OFFICE O/P EST MOD 30 MIN: CPT | Mod: 25,,, | Performed by: INTERNAL MEDICINE

## 2022-09-07 PROCEDURE — 80061 LIPID PANEL: CPT | Mod: ,,, | Performed by: CLINICAL MEDICAL LABORATORY

## 2022-09-07 PROCEDURE — 80061 LIPID PANEL: ICD-10-PCS | Mod: ,,, | Performed by: CLINICAL MEDICAL LABORATORY

## 2022-09-07 PROCEDURE — 99214 PR OFFICE/OUTPT VISIT, EST, LEVL IV, 30-39 MIN: ICD-10-PCS | Mod: 25,,, | Performed by: INTERNAL MEDICINE

## 2022-09-07 RX ORDER — GABAPENTIN 300 MG/1
300 CAPSULE ORAL 2 TIMES DAILY
Qty: 180 CAPSULE | Refills: 1 | Status: SHIPPED | OUTPATIENT
Start: 2022-09-07 | End: 2023-03-07

## 2022-09-07 RX ORDER — PREDNISONE 5 MG/1
5 TABLET ORAL DAILY
Qty: 90 TABLET | Refills: 1 | Status: SHIPPED | OUTPATIENT
Start: 2022-09-07 | End: 2023-03-07 | Stop reason: SDUPTHER

## 2022-09-07 RX ORDER — SULFASALAZINE 500 MG/1
500 TABLET, DELAYED RELEASE ORAL 2 TIMES DAILY
COMMUNITY
Start: 2022-06-28 | End: 2022-09-07

## 2022-09-07 NOTE — PROGRESS NOTES
New Clinic Note    Patient Name:  Shamar Mendez is a 61 y.o. male     Chief Complaint:    Chief Complaint   Patient presents with    Follow-up     Patient is here for his checkup today.     Urinary Problem     He reports difficulty urinating that is worse at night. Denies any pain with urination but states that he has difficulty starting flow of urination. He takes Flomax daily.     Did not bring meds     He brought a list.     Skin Problem     He has multiple non-healing/slow-healing sores on his arms that he wants Dr. Puga to look at today.         Subjective  HPI         Current Outpatient Medications:     acetaminophen (TYLENOL) 500 MG tablet, Take 500-1,000 mg by mouth every 6 (six) hours as needed for Pain., Disp: , Rfl:     amLODIPine-benazepriL (LOTREL) 10-40 mg per capsule, TAKE 1 CAPSULE DAILY, Disp: 90 capsule, Rfl: 3    buprenorphine-naloxone (SUBOXONE) 8-2 mg Film, Place 0.5 each under the tongue once daily. , Disp: , Rfl:     folic acid (FOLVITE) 1 MG tablet, TAKE 1 TABLET DAILY, Disp: 90 tablet, Rfl: 3    gabapentin (NEURONTIN) 300 MG capsule, Take 1 capsule (300 mg total) by mouth 2 (two) times daily., Disp: 180 capsule, Rfl: 1    HUMIRA,CF, PEN 40 mg/0.4 mL PnKt, , Disp: , Rfl:     hydrOXYchloroQUINE (PLAQUENIL) 200 mg tablet, Take 1 tablet (200 mg total) by mouth 2 (two) times daily., Disp: 180 tablet, Rfl: 1    methotrexate 2.5 MG Tab, Take 8 tablets by mouth weekly, Disp: 104 tablet, Rfl: 1    omeprazole (PRILOSEC) 20 MG capsule, Take 20 mg by mouth once daily. , Disp: , Rfl:     predniSONE (DELTASONE) 5 MG tablet, Take 1 tablet (5 mg total) by mouth once daily. With food., Disp: 90 tablet, Rfl: 1    tamsulosin (FLOMAX) 0.4 mg Cap, TAKE 1 CAPSULE DAILY, Disp: 90 capsule, Rfl: 3   Past Medical History:   Diagnosis Date    Arthritis     BPH (benign prostatic hyperplasia)     Carpal tunnel syndrome, bilateral     Hidradenitis suppurativa     History of knee replacement     Hypertension      Opioid dependence     Rheumatoid arthritis       Past Surgical History:   Procedure Laterality Date    CARPAL TUNNEL RELEASE Bilateral     KNEE ARTHROSCOPY Left     NECK SURGERY      TONSILLECTOMY      TOTAL KNEE ARTHROPLASTY Left       Family History   Problem Relation Age of Onset    Heart disease Mother     Hypertension Mother     Diabetes Father     Heart disease Father     Diabetes Maternal Grandmother     Diabetes Maternal Grandfather     Diabetes Paternal Grandfather     Melanoma Neg Hx       Social History     Tobacco Use    Smoking status: Every Day    Smokeless tobacco: Former   Substance Use Topics    Alcohol use: Never        Review of Systems   Constitutional:  Negative for fatigue and fever.   HENT:  Negative for nasal congestion and sore throat.    Respiratory:  Negative for cough, shortness of breath and wheezing.    Cardiovascular:  Negative for chest pain and palpitations.   Gastrointestinal:  Negative for abdominal pain and blood in stool.   Genitourinary:  Positive for difficulty urinating. Negative for dysuria.   Musculoskeletal:  Positive for arthralgias. Negative for back pain and neck pain.   Integumentary:  Positive for mole/lesion. Negative for rash.   Neurological:  Negative for dizziness, headaches and memory loss.   Psychiatric/Behavioral:  Negative for agitation. The patient is not nervous/anxious.       Objective:  /60 (BP Location: Left arm, Patient Position: Sitting)   Pulse 102   Temp 97 °F (36.1 °C) (Temporal)   Resp 16   Ht 6' (1.829 m)   Wt 97.2 kg (214 lb 3.2 oz)   SpO2 (!) 92%   BMI 29.05 kg/m²      Physical Exam  Constitutional:       Appearance: Normal appearance.   HENT:      Head: Normocephalic and atraumatic.      Right Ear: External ear normal.      Left Ear: External ear normal.      Nose: Nose normal.   Eyes:      Extraocular Movements: Extraocular movements intact.      Conjunctiva/sclera: Conjunctivae normal.      Pupils: Pupils are equal, round, and  reactive to light.   Cardiovascular:      Rate and Rhythm: Normal rate and regular rhythm.      Pulses: Normal pulses.      Heart sounds: Normal heart sounds. No murmur heard.    No friction rub. No gallop.   Pulmonary:      Effort: Pulmonary effort is normal.      Breath sounds: No wheezing, rhonchi or rales.   Abdominal:      General: Abdomen is flat.      Palpations: Abdomen is soft.   Musculoskeletal:      Cervical back: Normal range of motion and neck supple.      Right lower leg: No edema.      Left lower leg: No edema.   Skin:     General: Skin is warm and dry.      Findings: Lesion (2 seperate 1cm raised rough lesions to forearm) present. No rash.   Neurological:      General: No focal deficit present.      Mental Status: He is alert and oriented to person, place, and time.      Cranial Nerves: No cranial nerve deficit.   Psychiatric:         Mood and Affect: Mood normal.        Assessment and Plan    Primary hypertension  -     Lipid Panel; Future; Expected date: 09/07/2022    Rheumatoid arthritis involving multiple sites, unspecified whether rheumatoid factor present  -     gabapentin (NEURONTIN) 300 MG capsule; Take 1 capsule (300 mg total) by mouth 2 (two) times daily.  Dispense: 180 capsule; Refill: 1    Arthritis of right knee  -     predniSONE (DELTASONE) 5 MG tablet; Take 1 tablet (5 mg total) by mouth once daily. With food.  Dispense: 90 tablet; Refill: 1    Benign prostatic hyperplasia with weak urinary stream  -     Cancel: Ambulatory referral/consult to Urology; Future; Expected date: 09/14/2022  -     Ambulatory referral/consult to Urology; Future; Expected date: 09/14/2022    Screening PSA (prostate specific antigen)  -     PSA, Screening; Future; Expected date: 09/07/2022    Skin lesion       Problem List Items Addressed This Visit          Cardiac/Vascular    Hypertension - Primary (Chronic)    Relevant Orders    Lipid Panel       Renal/    BPH (benign prostatic hyperplasia) (Chronic)     Relevant Orders    Ambulatory referral/consult to Urology       Immunology/Multi System    Rheumatoid arthritis (Chronic)    Relevant Medications    gabapentin (NEURONTIN) 300 MG capsule       Orthopedic    Arthritis of right knee (Chronic)    Relevant Medications    predniSONE (DELTASONE) 5 MG tablet     Other Visit Diagnoses       Screening PSA (prostate specific antigen)        Relevant Orders    PSA, Screening    Skin lesion               1-HTN stable-lipid  2-BPH-refer to Urology  3-Skin lesion x 2 right forearm-Cryo used 5sec, pt tolerated procedure well  Follow up in about 6 months (around 3/7/2023).

## 2022-10-11 DIAGNOSIS — M06.9 RHEUMATOID ARTHRITIS INVOLVING MULTIPLE SITES, UNSPECIFIED WHETHER RHEUMATOID FACTOR PRESENT: ICD-10-CM

## 2022-10-11 RX ORDER — METHOTREXATE 2.5 MG/1
TABLET ORAL
Qty: 104 TABLET | Refills: 1 | Status: SHIPPED | OUTPATIENT
Start: 2022-10-11

## 2022-12-02 DIAGNOSIS — M06.9 RHEUMATOID ARTHRITIS INVOLVING MULTIPLE SITES, UNSPECIFIED WHETHER RHEUMATOID FACTOR PRESENT: ICD-10-CM

## 2022-12-02 RX ORDER — HYDROXYCHLOROQUINE SULFATE 200 MG/1
200 TABLET, FILM COATED ORAL 2 TIMES DAILY
Qty: 180 TABLET | Refills: 1 | Status: SHIPPED | OUTPATIENT
Start: 2022-12-02 | End: 2023-06-02 | Stop reason: SDUPTHER

## 2022-12-21 ENCOUNTER — CLINICAL SUPPORT (OUTPATIENT)
Dept: FAMILY MEDICINE | Facility: CLINIC | Age: 62
End: 2022-12-21
Payer: COMMERCIAL

## 2022-12-21 DIAGNOSIS — Z11.1 VISIT FOR TB SKIN TEST: Primary | ICD-10-CM

## 2022-12-21 PROCEDURE — 86580 POCT TB SKIN TEST: ICD-10-PCS | Mod: ,,, | Performed by: NURSE PRACTITIONER

## 2022-12-21 PROCEDURE — 86580 TB INTRADERMAL TEST: CPT | Mod: ,,, | Performed by: NURSE PRACTITIONER

## 2022-12-21 NOTE — PROGRESS NOTES
Patient presents to the clinic for TB skin test. Administered in left arm. Patient instructed to return to clinic between 2:30 PM Friday and 12:00 Saturday for test to be read. Patient verbalized understanding.

## 2022-12-23 ENCOUNTER — CLINICAL SUPPORT (OUTPATIENT)
Dept: FAMILY MEDICINE | Facility: CLINIC | Age: 62
End: 2022-12-23
Payer: COMMERCIAL

## 2022-12-23 LAB
TB INDURATION - 48 HR READ: 0 MM
TB INDURATION - 72 HR READ: 0 MM
TB SKIN TEST - 48 HR READ: NEGATIVE
TB SKIN TEST - 72 HR READ: NEGATIVE

## 2023-03-07 ENCOUNTER — OFFICE VISIT (OUTPATIENT)
Dept: FAMILY MEDICINE | Facility: CLINIC | Age: 63
End: 2023-03-07
Payer: COMMERCIAL

## 2023-03-07 VITALS
WEIGHT: 212 LBS | RESPIRATION RATE: 16 BRPM | HEART RATE: 98 BPM | SYSTOLIC BLOOD PRESSURE: 122 MMHG | BODY MASS INDEX: 28.71 KG/M2 | OXYGEN SATURATION: 91 % | TEMPERATURE: 97 F | HEIGHT: 72 IN | DIASTOLIC BLOOD PRESSURE: 62 MMHG

## 2023-03-07 DIAGNOSIS — L98.9 SKIN LESION: Primary | ICD-10-CM

## 2023-03-07 DIAGNOSIS — I10 PRIMARY HYPERTENSION: Chronic | ICD-10-CM

## 2023-03-07 DIAGNOSIS — R39.12 BENIGN PROSTATIC HYPERPLASIA WITH WEAK URINARY STREAM: Chronic | ICD-10-CM

## 2023-03-07 DIAGNOSIS — M17.11 ARTHRITIS OF RIGHT KNEE: ICD-10-CM

## 2023-03-07 DIAGNOSIS — N40.1 BENIGN PROSTATIC HYPERPLASIA WITH WEAK URINARY STREAM: Chronic | ICD-10-CM

## 2023-03-07 DIAGNOSIS — M06.9 RHEUMATOID ARTHRITIS INVOLVING MULTIPLE SITES, UNSPECIFIED WHETHER RHEUMATOID FACTOR PRESENT: ICD-10-CM

## 2023-03-07 PROCEDURE — 99214 PR OFFICE/OUTPT VISIT, EST, LEVL IV, 30-39 MIN: ICD-10-PCS | Mod: ,,, | Performed by: INTERNAL MEDICINE

## 2023-03-07 PROCEDURE — 99214 OFFICE O/P EST MOD 30 MIN: CPT | Mod: ,,, | Performed by: INTERNAL MEDICINE

## 2023-03-07 RX ORDER — FINASTERIDE 5 MG/1
5 TABLET, FILM COATED ORAL DAILY
COMMUNITY
Start: 2022-12-21 | End: 2023-10-02

## 2023-03-07 RX ORDER — FOLIC ACID 1 MG/1
1000 TABLET ORAL DAILY
Qty: 90 TABLET | Refills: 0 | Status: SHIPPED | OUTPATIENT
Start: 2023-03-07 | End: 2023-06-02 | Stop reason: SDUPTHER

## 2023-03-07 RX ORDER — PREDNISONE 5 MG/1
5 TABLET ORAL DAILY
Qty: 90 TABLET | Refills: 0 | Status: SHIPPED | OUTPATIENT
Start: 2023-03-07

## 2023-03-07 NOTE — PROGRESS NOTES
New Clinic Note    Patient Name:  Shamar Mendez is a 62 y.o. male     Chief Complaint:    Chief Complaint   Patient presents with    Follow-up     Patient is here for his checkup today.     Did not bring meds     He brought a med list.     Medication Refill     He is requesting a refill on his Folic Acid and Prednisone today. He sees Dr. Dawn for rheumatology and he sees him at the end of this month, but will run out of the medication before the appointment.     Skin Problem     He has some skin lesions on his right forearm that he wants evaluated today.         Subjective  Follow-up  Pertinent negatives include no abdominal pain, chest pain, congestion, coughing, fatigue, fever, headaches, neck pain, rash or sore throat.   Medication Refill  Pertinent negatives include no abdominal pain, chest pain, congestion, coughing, fatigue, fever, headaches, neck pain, rash or sore throat.          Current Outpatient Medications:     acetaminophen (TYLENOL) 500 MG tablet, Take 500-1,000 mg by mouth every 6 (six) hours as needed for Pain., Disp: , Rfl:     amLODIPine-benazepriL (LOTREL) 10-40 mg per capsule, TAKE 1 CAPSULE DAILY, Disp: 90 capsule, Rfl: 1    buprenorphine-naloxone (SUBOXONE) 8-2 mg Film, Place 0.5 each under the tongue once daily. , Disp: , Rfl:     finasteride (PROSCAR) 5 mg tablet, Take 5 mg by mouth once daily., Disp: , Rfl:     folic acid (FOLVITE) 1 MG tablet, Take 1 tablet (1,000 mcg total) by mouth once daily., Disp: 90 tablet, Rfl: 0    HUMIRA,CF, PEN 40 mg/0.4 mL PnKt, , Disp: , Rfl:     hydrOXYchloroQUINE (PLAQUENIL) 200 mg tablet, Take 1 tablet (200 mg total) by mouth 2 (two) times daily., Disp: 180 tablet, Rfl: 1    methotrexate 2.5 MG Tab, Take 8 tablets by mouth weekly, Disp: 104 tablet, Rfl: 1    omeprazole (PRILOSEC) 20 MG capsule, Take 20 mg by mouth once daily. , Disp: , Rfl:     predniSONE (DELTASONE) 5 MG tablet, Take 1 tablet (5 mg total) by mouth once daily. With food., Disp: 90  tablet, Rfl: 0    tamsulosin (FLOMAX) 0.4 mg Cap, TAKE 1 CAPSULE DAILY, Disp: 90 capsule, Rfl: 1   Past Medical History:   Diagnosis Date    Arthritis     BPH (benign prostatic hyperplasia)     Carpal tunnel syndrome, bilateral     Hidradenitis suppurativa     History of knee replacement     Hypertension     Opioid dependence     Rheumatoid arthritis       Past Surgical History:   Procedure Laterality Date    CARPAL TUNNEL RELEASE Bilateral     KNEE ARTHROSCOPY Left     NECK SURGERY      TONSILLECTOMY      TOTAL KNEE ARTHROPLASTY Left       Family History   Problem Relation Age of Onset    Heart disease Mother     Hypertension Mother     Diabetes Father     Heart disease Father     Diabetes Maternal Grandmother     Diabetes Maternal Grandfather     Diabetes Paternal Grandfather     Melanoma Neg Hx       Social History     Tobacco Use    Smoking status: Every Day     Packs/day: 1.00     Years: 48.00     Pack years: 48.00     Types: Cigarettes     Start date: 1975    Smokeless tobacco: Former   Substance Use Topics    Alcohol use: Never        Review of Systems   Constitutional:  Negative for fatigue and fever.   HENT:  Negative for nasal congestion and sore throat.    Respiratory:  Negative for cough, shortness of breath and wheezing.    Cardiovascular:  Negative for chest pain and palpitations.   Gastrointestinal:  Negative for abdominal pain and blood in stool.   Genitourinary:  Negative for dysuria.   Musculoskeletal:  Negative for back pain and neck pain.   Integumentary:  Positive for mole/lesion. Negative for rash.   Neurological:  Negative for dizziness, headaches and memory loss.   Psychiatric/Behavioral:  Negative for agitation. The patient is not nervous/anxious.       Objective:  /62 (BP Location: Left arm, Patient Position: Sitting)   Pulse 98   Temp 97 °F (36.1 °C) (Temporal)   Resp 16   Ht 6' (1.829 m)   Wt 96.2 kg (212 lb)   SpO2 (!) 91%   BMI 28.75 kg/m²      Physical  Exam  Constitutional:       Appearance: Normal appearance.   HENT:      Head: Normocephalic and atraumatic.      Right Ear: External ear normal.      Left Ear: External ear normal.      Nose: Nose normal.   Eyes:      Extraocular Movements: Extraocular movements intact.      Conjunctiva/sclera: Conjunctivae normal.      Pupils: Pupils are equal, round, and reactive to light.   Cardiovascular:      Rate and Rhythm: Normal rate and regular rhythm.      Pulses: Normal pulses.      Heart sounds: Normal heart sounds. No murmur heard.    No friction rub. No gallop.   Pulmonary:      Effort: Pulmonary effort is normal.      Breath sounds: No wheezing, rhonchi or rales.   Abdominal:      General: Abdomen is flat.      Palpations: Abdomen is soft.   Musculoskeletal:      Cervical back: Normal range of motion and neck supple.      Right lower leg: No edema.      Left lower leg: No edema.   Skin:     General: Skin is warm and dry.      Findings: Lesion (right forearm 1cm rough raised lesion and a second 0.5cm lesion similar in appearance) present. No rash.   Neurological:      General: No focal deficit present.      Mental Status: He is alert and oriented to person, place, and time.      Cranial Nerves: No cranial nerve deficit.   Psychiatric:         Mood and Affect: Mood normal.        Assessment and Plan    Skin lesion  -     Ambulatory referral/consult to General Surgery; Future; Expected date: 03/14/2023    Rheumatoid arthritis involving multiple sites, unspecified whether rheumatoid factor present  -     folic acid (FOLVITE) 1 MG tablet; Take 1 tablet (1,000 mcg total) by mouth once daily.  Dispense: 90 tablet; Refill: 0    Arthritis of right knee  -     predniSONE (DELTASONE) 5 MG tablet; Take 1 tablet (5 mg total) by mouth once daily. With food.  Dispense: 90 tablet; Refill: 0    Primary hypertension    Benign prostatic hyperplasia with weak urinary stream         Problem List Items Addressed This Visit           Cardiac/Vascular    Hypertension (Chronic)       Renal/    BPH (benign prostatic hyperplasia) (Chronic)       Immunology/Multi System    Rheumatoid arthritis (Chronic)    Relevant Medications    folic acid (FOLVITE) 1 MG tablet       Orthopedic    Arthritis of right knee (Chronic)    Relevant Medications    predniSONE (DELTASONE) 5 MG tablet     Other Visit Diagnoses       Skin lesion    -  Primary    Relevant Orders    Ambulatory referral/consult to General Surgery           1-HTN controlled, cont Rx  2-BPH-get records, now on flomax and finasteride  3-skin lesion-wants to see Lavonne for lesion removal  4-RA-stable, has f/u with Rheum later this month  Other labs next visit  Follow up in about 6 months (around 9/7/2023).

## 2023-06-02 DIAGNOSIS — M06.9 RHEUMATOID ARTHRITIS INVOLVING MULTIPLE SITES, UNSPECIFIED WHETHER RHEUMATOID FACTOR PRESENT: ICD-10-CM

## 2023-06-02 RX ORDER — HYDROXYCHLOROQUINE SULFATE 200 MG/1
200 TABLET, FILM COATED ORAL 2 TIMES DAILY
Qty: 180 TABLET | Refills: 1 | Status: SHIPPED | OUTPATIENT
Start: 2023-06-02

## 2023-06-02 RX ORDER — FOLIC ACID 1 MG/1
1000 TABLET ORAL DAILY
Qty: 90 TABLET | Refills: 1 | Status: SHIPPED | OUTPATIENT
Start: 2023-06-02

## 2023-07-25 RX ORDER — TAMSULOSIN HYDROCHLORIDE 0.4 MG/1
CAPSULE ORAL
Qty: 90 CAPSULE | Refills: 0 | Status: SHIPPED | OUTPATIENT
Start: 2023-07-25 | End: 2023-10-23

## 2023-07-25 RX ORDER — AMLODIPINE AND BENAZEPRIL HYDROCHLORIDE 10; 40 MG/1; MG/1
CAPSULE ORAL
Qty: 90 CAPSULE | Refills: 0 | Status: SHIPPED | OUTPATIENT
Start: 2023-07-25 | End: 2023-08-28 | Stop reason: SDUPTHER

## 2023-08-28 DIAGNOSIS — I10 PRIMARY HYPERTENSION: Primary | Chronic | ICD-10-CM

## 2023-08-28 RX ORDER — AMLODIPINE AND BENAZEPRIL HYDROCHLORIDE 10; 40 MG/1; MG/1
1 CAPSULE ORAL DAILY
Qty: 90 CAPSULE | Refills: 0 | Status: SHIPPED | OUTPATIENT
Start: 2023-08-28 | End: 2023-10-16

## 2023-10-02 ENCOUNTER — OFFICE VISIT (OUTPATIENT)
Dept: FAMILY MEDICINE | Facility: CLINIC | Age: 63
End: 2023-10-02
Payer: MEDICARE

## 2023-10-02 VITALS
RESPIRATION RATE: 16 BRPM | TEMPERATURE: 98 F | SYSTOLIC BLOOD PRESSURE: 139 MMHG | WEIGHT: 208.38 LBS | OXYGEN SATURATION: 92 % | DIASTOLIC BLOOD PRESSURE: 79 MMHG | HEIGHT: 72 IN | BODY MASS INDEX: 28.22 KG/M2 | HEART RATE: 106 BPM

## 2023-10-02 DIAGNOSIS — R74.8 ELEVATED LIVER ENZYMES: Primary | ICD-10-CM

## 2023-10-02 DIAGNOSIS — Z72.0 SMOKING TRYING TO QUIT: ICD-10-CM

## 2023-10-02 DIAGNOSIS — R73.9 HYPERGLYCEMIA: ICD-10-CM

## 2023-10-02 DIAGNOSIS — I10 PRIMARY HYPERTENSION: Chronic | ICD-10-CM

## 2023-10-02 DIAGNOSIS — M05.79 RHEUMATOID ARTHRITIS INVOLVING MULTIPLE SITES WITH POSITIVE RHEUMATOID FACTOR: Chronic | ICD-10-CM

## 2023-10-02 PROCEDURE — 99214 PR OFFICE/OUTPT VISIT, EST, LEVL IV, 30-39 MIN: ICD-10-PCS | Mod: 25,,, | Performed by: INTERNAL MEDICINE

## 2023-10-02 PROCEDURE — 99214 OFFICE O/P EST MOD 30 MIN: CPT | Mod: 25,,, | Performed by: INTERNAL MEDICINE

## 2023-10-02 PROCEDURE — 90686 IIV4 VACC NO PRSV 0.5 ML IM: CPT | Mod: ,,, | Performed by: INTERNAL MEDICINE

## 2023-10-02 PROCEDURE — 90686 FLU VACCINE (QUAD) GREATER THAN OR EQUAL TO 3YO PRESERVATIVE FREE IM: ICD-10-PCS | Mod: ,,, | Performed by: INTERNAL MEDICINE

## 2023-10-02 PROCEDURE — G0008 FLU VACCINE (QUAD) GREATER THAN OR EQUAL TO 3YO PRESERVATIVE FREE IM: ICD-10-PCS | Mod: ,,, | Performed by: INTERNAL MEDICINE

## 2023-10-02 PROCEDURE — G0008 ADMIN INFLUENZA VIRUS VAC: HCPCS | Mod: ,,, | Performed by: INTERNAL MEDICINE

## 2023-10-02 RX ORDER — BUPROPION HYDROCHLORIDE 150 MG/1
150 TABLET, EXTENDED RELEASE ORAL 2 TIMES DAILY
Qty: 180 TABLET | Refills: 1 | Status: SHIPPED | OUTPATIENT
Start: 2023-10-02 | End: 2023-10-30

## 2023-10-02 NOTE — PROGRESS NOTES
New Clinic Note    Patient Name:  Shamar Mendez is a 62 y.o. male     Chief Complaint:    Chief Complaint   Patient presents with    Follow-up     Patient is here to follow up after having labs drawn last week at his rheumatologist's office. He was told his liver enzymes were elevated and that he needed to follow up with PCP.     Nicotine Dependence     He wants to discuss smoking cessation.     Flu Vaccine     He wants his flu shot today.     Did not bring meds        Subjective  Follow-up  Associated symptoms include arthralgias. Pertinent negatives include no abdominal pain, chest pain, congestion, coughing, fatigue, fever, headaches, neck pain, rash or sore throat.   Nicotine Dependence  Symptoms are negative for fatigue and sore throat. His urge triggers include company of smokers.            Current Outpatient Medications:     acetaminophen (TYLENOL) 500 MG tablet, Take 500-1,000 mg by mouth every 6 (six) hours as needed for Pain., Disp: , Rfl:     amLODIPine-benazepriL (LOTREL) 10-40 mg per capsule, Take 1 capsule by mouth once daily., Disp: 90 capsule, Rfl: 0    buprenorphine-naloxone (SUBOXONE) 8-2 mg Film, Place 0.5 each under the tongue once daily. , Disp: , Rfl:     buPROPion (WELLBUTRIN SR) 150 MG TBSR 12 hr tablet, Take 1 tablet (150 mg total) by mouth 2 (two) times daily., Disp: 180 tablet, Rfl: 1    folic acid (FOLVITE) 1 MG tablet, Take 1 tablet (1,000 mcg total) by mouth once daily., Disp: 90 tablet, Rfl: 1    HUMIRA,CF, PEN 40 mg/0.4 mL PnKt, , Disp: , Rfl:     hydrOXYchloroQUINE (PLAQUENIL) 200 mg tablet, Take 1 tablet (200 mg total) by mouth 2 (two) times daily., Disp: 180 tablet, Rfl: 1    methotrexate 2.5 MG Tab, Take 8 tablets by mouth weekly, Disp: 104 tablet, Rfl: 1    omeprazole (PRILOSEC) 20 MG capsule, Take 20 mg by mouth once daily. , Disp: , Rfl:     predniSONE (DELTASONE) 5 MG tablet, Take 1 tablet (5 mg total) by mouth once daily. With food., Disp: 90 tablet, Rfl: 0    tamsulosin  (FLOMAX) 0.4 mg Cap, TAKE 1 CAPSULE DAILY (Patient taking differently: Take 0.8 mg by mouth once daily.), Disp: 90 capsule, Rfl: 0   Past Medical History:   Diagnosis Date    Arthritis     BPH (benign prostatic hyperplasia)     Carpal tunnel syndrome, bilateral     Hidradenitis suppurativa     History of knee replacement     Hypertension     Opioid dependence     Rheumatoid arthritis       Past Surgical History:   Procedure Laterality Date    CARPAL TUNNEL RELEASE Bilateral     KNEE ARTHROSCOPY Left     NECK SURGERY      TONSILLECTOMY      TOTAL KNEE ARTHROPLASTY Left       Family History   Problem Relation Age of Onset    Heart disease Mother     Hypertension Mother     Diabetes Father     Heart disease Father     Diabetes Maternal Grandmother     Diabetes Maternal Grandfather     Diabetes Paternal Grandfather     Melanoma Neg Hx       Social History     Tobacco Use    Smoking status: Every Day     Current packs/day: 1.00     Average packs/day: 1 pack/day for 48.8 years (48.8 ttl pk-yrs)     Types: Cigarettes     Start date: 1975     Passive exposure: Current    Smokeless tobacco: Former   Substance Use Topics    Alcohol use: Never        Review of Systems   Constitutional:  Negative for fatigue and fever.   HENT:  Negative for nasal congestion and sore throat.    Respiratory:  Negative for cough, shortness of breath and wheezing.    Cardiovascular:  Negative for chest pain and palpitations.   Gastrointestinal:  Negative for abdominal pain and blood in stool.   Genitourinary:  Negative for dysuria.   Musculoskeletal:  Positive for arthralgias and back pain. Negative for neck pain.   Integumentary:  Negative for rash and mole/lesion.   Neurological:  Negative for dizziness, headaches and memory loss.   Psychiatric/Behavioral:  Negative for agitation. The patient is not nervous/anxious.         Objective:  /79 (BP Location: Left arm, Patient Position: Sitting)   Pulse 106   Temp 97.8 °F (36.6 °C) (Oral)    Resp 16   Ht 6' (1.829 m)   Wt 94.5 kg (208 lb 6.4 oz)   SpO2 (!) 92%   BMI 28.26 kg/m²      Physical Exam  Constitutional:       Appearance: Normal appearance.   HENT:      Head: Normocephalic and atraumatic.      Right Ear: External ear normal.      Left Ear: External ear normal.      Nose: Nose normal.   Eyes:      Extraocular Movements: Extraocular movements intact.      Conjunctiva/sclera: Conjunctivae normal.      Pupils: Pupils are equal, round, and reactive to light.   Cardiovascular:      Rate and Rhythm: Normal rate and regular rhythm.      Pulses: Normal pulses.      Heart sounds: Normal heart sounds. No murmur heard.     No friction rub. No gallop.   Pulmonary:      Effort: Pulmonary effort is normal.      Breath sounds: No wheezing, rhonchi or rales.   Abdominal:      General: Abdomen is flat.      Palpations: Abdomen is soft.   Musculoskeletal:      Cervical back: Normal range of motion and neck supple.      Right lower leg: No edema.      Left lower leg: No edema.   Skin:     General: Skin is warm and dry.      Findings: No rash.   Neurological:      General: No focal deficit present.      Mental Status: He is alert and oriented to person, place, and time.      Cranial Nerves: No cranial nerve deficit.   Psychiatric:         Mood and Affect: Mood normal.          Assessment and Plan    Smoking trying to quit  -     buPROPion (WELLBUTRIN SR) 150 MG TBSR 12 hr tablet; Take 1 tablet (150 mg total) by mouth 2 (two) times daily.  Dispense: 180 tablet; Refill: 1    Elevated liver enzymes    Primary hypertension    Rheumatoid arthritis involving multiple sites with positive rheumatoid factor    Other orders  -     Influenza - Quadrivalent (PF)         Problem List Items Addressed This Visit          Cardiac/Vascular    Hypertension (Chronic)       Immunology/Multi System    Rheumatoid arthritis (Chronic)     Other Visit Diagnoses       Smoking trying to quit    -  Primary    Relevant Medications     buPROPion (WELLBUTRIN SR) 150 MG TBSR 12 hr tablet    Elevated liver enzymes             1-Elevated AST-only up to 65-will recheck one month-if still up then would do u/s, further blood workup and have to consider lowering or stopping MTX  2-RA-had been out of Humira-this is probably why his ESR/CRP is up  3-HTN stable  4-wants to quit smoking-start welbutrin  5-Glucose was 141-will get a1c upon RTC  Recheck him in one month (lipids at that time also)    Follow up in about 4 weeks (around 10/30/2023).

## 2023-10-09 DIAGNOSIS — Z71.89 COMPLEX CARE COORDINATION: ICD-10-CM

## 2023-10-13 DIAGNOSIS — I10 PRIMARY HYPERTENSION: Chronic | ICD-10-CM

## 2023-10-16 RX ORDER — AMLODIPINE AND BENAZEPRIL HYDROCHLORIDE 10; 40 MG/1; MG/1
1 CAPSULE ORAL DAILY
Qty: 90 CAPSULE | Refills: 1 | Status: SHIPPED | OUTPATIENT
Start: 2023-10-16 | End: 2024-01-08

## 2023-10-23 RX ORDER — TAMSULOSIN HYDROCHLORIDE 0.4 MG/1
0.8 CAPSULE ORAL DAILY
Qty: 180 CAPSULE | Refills: 1 | Status: SHIPPED | OUTPATIENT
Start: 2023-10-23

## 2023-10-30 ENCOUNTER — OFFICE VISIT (OUTPATIENT)
Dept: FAMILY MEDICINE | Facility: CLINIC | Age: 63
End: 2023-10-30
Payer: MEDICARE

## 2023-10-30 VITALS
OXYGEN SATURATION: 91 % | HEART RATE: 97 BPM | HEIGHT: 72 IN | TEMPERATURE: 98 F | SYSTOLIC BLOOD PRESSURE: 122 MMHG | RESPIRATION RATE: 16 BRPM | WEIGHT: 209.63 LBS | DIASTOLIC BLOOD PRESSURE: 82 MMHG | BODY MASS INDEX: 28.39 KG/M2

## 2023-10-30 DIAGNOSIS — Z72.0 SMOKING TRYING TO QUIT: Primary | ICD-10-CM

## 2023-10-30 DIAGNOSIS — D84.821 DRUG-INDUCED IMMUNODEFICIENCY: ICD-10-CM

## 2023-10-30 DIAGNOSIS — R74.8 ELEVATED LIVER ENZYMES: ICD-10-CM

## 2023-10-30 DIAGNOSIS — Z79.899 DRUG-INDUCED IMMUNODEFICIENCY: ICD-10-CM

## 2023-10-30 DIAGNOSIS — R73.9 HYPERGLYCEMIA: ICD-10-CM

## 2023-10-30 LAB
ALBUMIN SERPL BCP-MCNC: 3.8 G/DL (ref 3.5–5)
ALP SERPL-CCNC: 85 U/L (ref 45–115)
ALT SERPL W P-5'-P-CCNC: 69 U/L (ref 16–61)
AST SERPL W P-5'-P-CCNC: 34 U/L (ref 15–37)
BILIRUB DIRECT SERPL-MCNC: 0.2 MG/DL (ref 0–0.2)
BILIRUB SERPL-MCNC: 0.6 MG/DL (ref ?–1.2)
EST. AVERAGE GLUCOSE BLD GHB EST-MCNC: 97 MG/DL
HBA1C MFR BLD HPLC: 5.5 % (ref 4.5–6.6)
PROT SERPL-MCNC: 7.8 G/DL (ref 6.4–8.2)

## 2023-10-30 PROCEDURE — 3074F PR MOST RECENT SYSTOLIC BLOOD PRESSURE < 130 MM HG: ICD-10-PCS | Mod: ,,, | Performed by: INTERNAL MEDICINE

## 2023-10-30 PROCEDURE — 1159F MED LIST DOCD IN RCRD: CPT | Mod: ,,, | Performed by: INTERNAL MEDICINE

## 2023-10-30 PROCEDURE — 3079F DIAST BP 80-89 MM HG: CPT | Mod: ,,, | Performed by: INTERNAL MEDICINE

## 2023-10-30 PROCEDURE — 3008F PR BODY MASS INDEX (BMI) DOCUMENTED: ICD-10-PCS | Mod: ,,, | Performed by: INTERNAL MEDICINE

## 2023-10-30 PROCEDURE — 83036 HEMOGLOBIN GLYCOSYLATED A1C: CPT | Mod: ,,, | Performed by: CLINICAL MEDICAL LABORATORY

## 2023-10-30 PROCEDURE — 4010F ACE/ARB THERAPY RXD/TAKEN: CPT | Mod: ,,, | Performed by: INTERNAL MEDICINE

## 2023-10-30 PROCEDURE — 83036 HEMOGLOBIN A1C: ICD-10-PCS | Mod: ,,, | Performed by: CLINICAL MEDICAL LABORATORY

## 2023-10-30 PROCEDURE — 3074F SYST BP LT 130 MM HG: CPT | Mod: ,,, | Performed by: INTERNAL MEDICINE

## 2023-10-30 PROCEDURE — 1159F PR MEDICATION LIST DOCUMENTED IN MEDICAL RECORD: ICD-10-PCS | Mod: ,,, | Performed by: INTERNAL MEDICINE

## 2023-10-30 PROCEDURE — 99214 OFFICE O/P EST MOD 30 MIN: CPT | Mod: ,,, | Performed by: INTERNAL MEDICINE

## 2023-10-30 PROCEDURE — 80076 HEPATIC FUNCTION PANEL: CPT | Mod: ,,, | Performed by: CLINICAL MEDICAL LABORATORY

## 2023-10-30 PROCEDURE — 3079F PR MOST RECENT DIASTOLIC BLOOD PRESSURE 80-89 MM HG: ICD-10-PCS | Mod: ,,, | Performed by: INTERNAL MEDICINE

## 2023-10-30 PROCEDURE — 99214 PR OFFICE/OUTPT VISIT, EST, LEVL IV, 30-39 MIN: ICD-10-PCS | Mod: ,,, | Performed by: INTERNAL MEDICINE

## 2023-10-30 PROCEDURE — 1160F PR REVIEW ALL MEDS BY PRESCRIBER/CLIN PHARMACIST DOCUMENTED: ICD-10-PCS | Mod: ,,, | Performed by: INTERNAL MEDICINE

## 2023-10-30 PROCEDURE — 3008F BODY MASS INDEX DOCD: CPT | Mod: ,,, | Performed by: INTERNAL MEDICINE

## 2023-10-30 PROCEDURE — 80076 HEPATIC FUNCTION PANEL: ICD-10-PCS | Mod: ,,, | Performed by: CLINICAL MEDICAL LABORATORY

## 2023-10-30 PROCEDURE — 1160F RVW MEDS BY RX/DR IN RCRD: CPT | Mod: ,,, | Performed by: INTERNAL MEDICINE

## 2023-10-30 PROCEDURE — 4010F PR ACE/ARB THEARPY RXD/TAKEN: ICD-10-PCS | Mod: ,,, | Performed by: INTERNAL MEDICINE

## 2023-10-30 RX ORDER — VARENICLINE TARTRATE 0.5 (11)-1
KIT ORAL
Qty: 1 EACH | Refills: 0 | Status: SHIPPED | OUTPATIENT
Start: 2023-10-30 | End: 2024-01-29

## 2023-10-30 NOTE — PROGRESS NOTES
New Clinic Note    Patient Name:  Shamar Mendez is a 63 y.o. male     Chief Complaint:    Chief Complaint   Patient presents with    Follow-up     Patient is here to follow up on elevated liver enzymes, elevated glucose, and smoking cessation that was all discussed at his last visit.     Nicotine Dependence     He was unable to tolerate Wellbutrin. He states he felt like he was going crazy on it, so he had to stop taking it.     Did not bring meds        Subjective  Follow-up  Associated symptoms include coughing. Pertinent negatives include no abdominal pain, chest pain, congestion, fatigue, fever, headaches, neck pain, rash or sore throat.   Nicotine Dependence  Symptoms are negative for fatigue and sore throat. His urge triggers include company of smokers.            Current Outpatient Medications:     acetaminophen (TYLENOL) 500 MG tablet, Take 500-1,000 mg by mouth every 6 (six) hours as needed for Pain., Disp: , Rfl:     amLODIPine-benazepriL (LOTREL) 10-40 mg per capsule, TAKE 1 CAPSULE BY MOUTH ONCE  DAILY, Disp: 90 capsule, Rfl: 1    buprenorphine-naloxone (SUBOXONE) 8-2 mg Film, Place 0.5 each under the tongue once daily. , Disp: , Rfl:     folic acid (FOLVITE) 1 MG tablet, Take 1 tablet (1,000 mcg total) by mouth once daily., Disp: 90 tablet, Rfl: 1    HUMIRA,CF, PEN 40 mg/0.4 mL PnKt, , Disp: , Rfl:     hydrOXYchloroQUINE (PLAQUENIL) 200 mg tablet, Take 1 tablet (200 mg total) by mouth 2 (two) times daily., Disp: 180 tablet, Rfl: 1    methotrexate 2.5 MG Tab, Take 8 tablets by mouth weekly, Disp: 104 tablet, Rfl: 1    omeprazole (PRILOSEC) 20 MG capsule, Take 20 mg by mouth once daily. , Disp: , Rfl:     predniSONE (DELTASONE) 5 MG tablet, Take 1 tablet (5 mg total) by mouth once daily. With food., Disp: 90 tablet, Rfl: 0    tamsulosin (FLOMAX) 0.4 mg Cap, Take 2 capsules (0.8 mg total) by mouth once daily., Disp: 180 capsule, Rfl: 1    varenicline (CHANTIX STARTING MONTH BOX) 0.5 mg (11)- 1 mg (42)  tablet, Take one 0.5mg tab by mouth once daily X3 days,then increase to one 0.5mg tab twice daily X4 days,then increase to one 1mg tab twice daily, Disp: 1 each, Rfl: 0   Past Medical History:   Diagnosis Date    Arthritis     BPH (benign prostatic hyperplasia)     Carpal tunnel syndrome, bilateral     Hidradenitis suppurativa     History of knee replacement     Hypertension     Opioid dependence     Rheumatoid arthritis       Past Surgical History:   Procedure Laterality Date    CARPAL TUNNEL RELEASE Bilateral     KNEE ARTHROSCOPY Left     NECK SURGERY      TONSILLECTOMY      TOTAL KNEE ARTHROPLASTY Left       Family History   Problem Relation Age of Onset    Heart disease Mother     Hypertension Mother     Diabetes Father     Heart disease Father     Diabetes Maternal Grandmother     Diabetes Maternal Grandfather     Diabetes Paternal Grandfather     Melanoma Neg Hx       Social History     Tobacco Use    Smoking status: Every Day     Current packs/day: 1.00     Average packs/day: 1 pack/day for 48.8 years (48.8 ttl pk-yrs)     Types: Cigarettes     Start date: 1975     Passive exposure: Current    Smokeless tobacco: Former   Substance Use Topics    Alcohol use: Never        Review of Systems   Constitutional:  Negative for fatigue and fever.   HENT:  Negative for nasal congestion and sore throat.    Respiratory:  Positive for cough. Negative for shortness of breath and wheezing.    Cardiovascular:  Negative for chest pain and palpitations.   Gastrointestinal:  Negative for abdominal pain and blood in stool.   Genitourinary:  Negative for dysuria.   Musculoskeletal:  Negative for back pain and neck pain.   Integumentary:  Negative for rash and mole/lesion.   Neurological:  Negative for dizziness, headaches and memory loss.   Psychiatric/Behavioral:  Negative for agitation. The patient is not nervous/anxious.         Objective:  /82 (BP Location: Right arm, Patient Position: Sitting)   Pulse 97   Temp 98.1  °F (36.7 °C) (Oral)   Resp 16   Ht 6' (1.829 m)   Wt 95.1 kg (209 lb 9.6 oz)   SpO2 (!) 91%   BMI 28.43 kg/m²      Physical Exam  Constitutional:       Appearance: Normal appearance.   HENT:      Head: Normocephalic and atraumatic.      Right Ear: External ear normal.      Left Ear: External ear normal.      Nose: Nose normal.   Eyes:      Extraocular Movements: Extraocular movements intact.      Conjunctiva/sclera: Conjunctivae normal.      Pupils: Pupils are equal, round, and reactive to light.   Cardiovascular:      Rate and Rhythm: Normal rate and regular rhythm.      Pulses: Normal pulses.      Heart sounds: Normal heart sounds. No murmur heard.     No friction rub. No gallop.   Pulmonary:      Effort: Pulmonary effort is normal.      Breath sounds: No wheezing, rhonchi or rales.   Abdominal:      General: Abdomen is flat.      Palpations: Abdomen is soft.   Musculoskeletal:      Cervical back: Normal range of motion and neck supple.      Right lower leg: No edema.      Left lower leg: No edema.   Skin:     General: Skin is warm and dry.      Findings: No rash.   Neurological:      General: No focal deficit present.      Mental Status: He is alert and oriented to person, place, and time.      Cranial Nerves: No cranial nerve deficit.   Psychiatric:         Mood and Affect: Mood normal.          Assessment and Plan    Smoking trying to quit  -     varenicline (CHANTIX STARTING MONTH BOX) 0.5 mg (11)- 1 mg (42) tablet; Take one 0.5mg tab by mouth once daily X3 days,then increase to one 0.5mg tab twice daily X4 days,then increase to one 1mg tab twice daily  Dispense: 1 each; Refill: 0    Elevated liver enzymes  -     Hepatic Function Panel; Future; Expected date: 10/30/2023    Hyperglycemia  -     Hemoglobin A1C; Future; Expected date: 10/30/2023    Drug-induced immunodeficiency         Problem List Items Addressed This Visit          Immunology/Multi System    Drug-induced immunodeficiency     Other Visit  Diagnoses       Smoking trying to quit    -  Primary    Relevant Medications    varenicline (CHANTIX STARTING MONTH BOX) 0.5 mg (11)- 1 mg (42) tablet    Elevated liver enzymes        Relevant Orders    Hepatic Function Panel    Hyperglycemia        Relevant Orders    Hemoglobin A1C         1-Elevated AST about 1 month ago-recheck today-workup further if needed  2-Try Chantix for smoking-if he has problems he is to stop it  3-hyperglycemia-check a1c  4-cough-if persists will need more workup    No follow-ups on file.

## 2023-10-31 ENCOUNTER — TELEPHONE (OUTPATIENT)
Dept: FAMILY MEDICINE | Facility: CLINIC | Age: 63
End: 2023-10-31
Payer: MEDICARE

## 2023-10-31 DIAGNOSIS — R74.01 ELEVATED AST (SGOT): Primary | ICD-10-CM

## 2023-10-31 NOTE — TELEPHONE ENCOUNTER
----- Message from Harish Puga MD sent at 10/31/2023  5:52 AM CDT -----  AST is up just a little, schedule liver ultrasound,  blood sugar is normal-no diabetes

## 2024-01-07 DIAGNOSIS — I10 PRIMARY HYPERTENSION: Chronic | ICD-10-CM

## 2024-01-08 RX ORDER — AMLODIPINE AND BENAZEPRIL HYDROCHLORIDE 10; 40 MG/1; MG/1
1 CAPSULE ORAL DAILY
Qty: 90 CAPSULE | Refills: 1 | Status: SHIPPED | OUTPATIENT
Start: 2024-01-08

## 2024-01-29 ENCOUNTER — OFFICE VISIT (OUTPATIENT)
Dept: FAMILY MEDICINE | Facility: CLINIC | Age: 64
End: 2024-01-29
Payer: MEDICARE

## 2024-01-29 ENCOUNTER — HOSPITAL ENCOUNTER (OUTPATIENT)
Dept: RADIOLOGY | Facility: HOSPITAL | Age: 64
Discharge: HOME | End: 2024-01-29
Attending: INTERNAL MEDICINE
Payer: MEDICARE

## 2024-01-29 VITALS
TEMPERATURE: 98 F | BODY MASS INDEX: 27.9 KG/M2 | DIASTOLIC BLOOD PRESSURE: 68 MMHG | WEIGHT: 206 LBS | RESPIRATION RATE: 16 BRPM | HEIGHT: 72 IN | SYSTOLIC BLOOD PRESSURE: 132 MMHG | HEART RATE: 105 BPM | OXYGEN SATURATION: 91 %

## 2024-01-29 DIAGNOSIS — I10 PRIMARY HYPERTENSION: Primary | Chronic | ICD-10-CM

## 2024-01-29 DIAGNOSIS — M05.79 RHEUMATOID ARTHRITIS INVOLVING MULTIPLE SITES WITH POSITIVE RHEUMATOID FACTOR: ICD-10-CM

## 2024-01-29 DIAGNOSIS — R06.09 DYSPNEA ON EXERTION: ICD-10-CM

## 2024-01-29 DIAGNOSIS — L73.2 HIDRADENITIS SUPPURATIVA: Chronic | ICD-10-CM

## 2024-01-29 DIAGNOSIS — Z79.899 DRUG-INDUCED IMMUNODEFICIENCY: ICD-10-CM

## 2024-01-29 DIAGNOSIS — D84.821 DRUG-INDUCED IMMUNODEFICIENCY: ICD-10-CM

## 2024-01-29 LAB
ANION GAP SERPL CALCULATED.3IONS-SCNC: 11 MMOL/L (ref 7–16)
BASOPHILS # BLD AUTO: 0.08 K/UL (ref 0–0.2)
BASOPHILS NFR BLD AUTO: 0.6 % (ref 0–1)
BUN SERPL-MCNC: 17 MG/DL (ref 7–18)
BUN/CREAT SERPL: 16 (ref 6–20)
CALCIUM SERPL-MCNC: 9.1 MG/DL (ref 8.5–10.1)
CHLORIDE SERPL-SCNC: 105 MMOL/L (ref 98–107)
CHOLEST SERPL-MCNC: 168 MG/DL (ref 0–200)
CHOLEST/HDLC SERPL: 2.5 {RATIO}
CO2 SERPL-SCNC: 25 MMOL/L (ref 21–32)
CREAT SERPL-MCNC: 1.06 MG/DL (ref 0.7–1.3)
DIFFERENTIAL METHOD BLD: ABNORMAL
EGFR (NO RACE VARIABLE) (RUSH/TITUS): 78 ML/MIN/1.73M2
EOSINOPHIL # BLD AUTO: 0.19 K/UL (ref 0–0.5)
EOSINOPHIL NFR BLD AUTO: 1.5 % (ref 1–4)
ERYTHROCYTE [DISTWIDTH] IN BLOOD BY AUTOMATED COUNT: 15.9 % (ref 11.5–14.5)
GLUCOSE SERPL-MCNC: 122 MG/DL (ref 74–106)
HCT VFR BLD AUTO: 45.9 % (ref 40–54)
HDLC SERPL-MCNC: 67 MG/DL (ref 40–60)
HGB BLD-MCNC: 15.1 G/DL (ref 13.5–18)
IMM GRANULOCYTES # BLD AUTO: 0.06 K/UL (ref 0–0.04)
IMM GRANULOCYTES NFR BLD: 0.5 % (ref 0–0.4)
LDLC SERPL CALC-MCNC: 83 MG/DL
LDLC/HDLC SERPL: 1.2 {RATIO}
LYMPHOCYTES # BLD AUTO: 1.13 K/UL (ref 1–4.8)
LYMPHOCYTES NFR BLD AUTO: 9.1 % (ref 27–41)
MCH RBC QN AUTO: 32.8 PG (ref 27–31)
MCHC RBC AUTO-ENTMCNC: 32.9 G/DL (ref 32–36)
MCV RBC AUTO: 99.6 FL (ref 80–96)
MONOCYTES # BLD AUTO: 0.77 K/UL (ref 0–0.8)
MONOCYTES NFR BLD AUTO: 6.2 % (ref 2–6)
MPC BLD CALC-MCNC: 10.6 FL (ref 9.4–12.4)
NEUTROPHILS # BLD AUTO: 10.2 K/UL (ref 1.8–7.7)
NEUTROPHILS NFR BLD AUTO: 82.1 % (ref 53–65)
NONHDLC SERPL-MCNC: 101 MG/DL
NRBC # BLD AUTO: 0 X10E3/UL
NRBC, AUTO (.00): 0 %
NT-PROBNP SERPL-MCNC: 243 PG/ML (ref 1–125)
PLATELET # BLD AUTO: 221 K/UL (ref 150–400)
POTASSIUM SERPL-SCNC: 4 MMOL/L (ref 3.5–5.1)
RBC # BLD AUTO: 4.61 M/UL (ref 4.6–6.2)
SODIUM SERPL-SCNC: 137 MMOL/L (ref 136–145)
TRIGL SERPL-MCNC: 91 MG/DL (ref 35–150)
VLDLC SERPL-MCNC: 18 MG/DL
WBC # BLD AUTO: 12.43 K/UL (ref 4.5–11)

## 2024-01-29 PROCEDURE — 93005 ELECTROCARDIOGRAM TRACING: CPT | Mod: ,,, | Performed by: INTERNAL MEDICINE

## 2024-01-29 PROCEDURE — 85025 COMPLETE CBC W/AUTO DIFF WBC: CPT | Mod: ,,, | Performed by: CLINICAL MEDICAL LABORATORY

## 2024-01-29 PROCEDURE — 93010 ELECTROCARDIOGRAM REPORT: CPT | Mod: ,,, | Performed by: INTERNAL MEDICINE

## 2024-01-29 PROCEDURE — 3075F SYST BP GE 130 - 139MM HG: CPT | Mod: ,,, | Performed by: INTERNAL MEDICINE

## 2024-01-29 PROCEDURE — 1160F RVW MEDS BY RX/DR IN RCRD: CPT | Mod: ,,, | Performed by: INTERNAL MEDICINE

## 2024-01-29 PROCEDURE — 3008F BODY MASS INDEX DOCD: CPT | Mod: ,,, | Performed by: INTERNAL MEDICINE

## 2024-01-29 PROCEDURE — 3078F DIAST BP <80 MM HG: CPT | Mod: ,,, | Performed by: INTERNAL MEDICINE

## 2024-01-29 PROCEDURE — 71046 X-RAY EXAM CHEST 2 VIEWS: CPT | Mod: TC,PN

## 2024-01-29 PROCEDURE — 4010F ACE/ARB THERAPY RXD/TAKEN: CPT | Mod: ,,, | Performed by: INTERNAL MEDICINE

## 2024-01-29 PROCEDURE — 1159F MED LIST DOCD IN RCRD: CPT | Mod: ,,, | Performed by: INTERNAL MEDICINE

## 2024-01-29 PROCEDURE — 99214 OFFICE O/P EST MOD 30 MIN: CPT | Mod: ,,, | Performed by: INTERNAL MEDICINE

## 2024-01-29 PROCEDURE — 83880 ASSAY OF NATRIURETIC PEPTIDE: CPT | Mod: ,,, | Performed by: CLINICAL MEDICAL LABORATORY

## 2024-01-29 PROCEDURE — 80048 BASIC METABOLIC PNL TOTAL CA: CPT | Mod: ,,, | Performed by: CLINICAL MEDICAL LABORATORY

## 2024-01-29 PROCEDURE — 80061 LIPID PANEL: CPT | Mod: ,,, | Performed by: CLINICAL MEDICAL LABORATORY

## 2024-01-29 RX ORDER — FINASTERIDE 5 MG/1
5 TABLET, FILM COATED ORAL DAILY
COMMUNITY
Start: 2024-01-10

## 2024-01-29 NOTE — PROGRESS NOTES
New Clinic Note    Patient Name:  Shamar Mendez is a 63 y.o. male     Chief Complaint:    Chief Complaint   Patient presents with    Follow-up     Patient is here for his checkup today.     did not bring meds    Shortness of Breath     He reports shortness of breath with little activity.     Sinus Problem     He also reports sinus congestion/drainage, mostly dry cough, runny nose for one month.         Subjective  Follow-up  Associated symptoms include congestion. Pertinent negatives include no abdominal pain, chest pain, coughing, fatigue, fever, headaches, neck pain, rash or sore throat.   Shortness of Breath  Associated symptoms include rhinorrhea. Pertinent negatives include no abdominal pain, chest pain, fever, headaches, neck pain, rash, sore throat or wheezing.   Sinus Problem  Associated symptoms include congestion, shortness of breath and sinus pressure. Pertinent negatives include no coughing, headaches, neck pain or sore throat.            Current Outpatient Medications:     finasteride (PROSCAR) 5 mg tablet, Take 5 mg by mouth once daily., Disp: , Rfl:     acetaminophen (TYLENOL) 500 MG tablet, Take 500-1,000 mg by mouth every 6 (six) hours as needed for Pain., Disp: , Rfl:     amLODIPine-benazepriL (LOTREL) 10-40 mg per capsule, TAKE 1 CAPSULE BY MOUTH ONCE  DAILY, Disp: 90 capsule, Rfl: 1    buprenorphine-naloxone (SUBOXONE) 8-2 mg Film, Place 0.5 each under the tongue once daily. , Disp: , Rfl:     folic acid (FOLVITE) 1 MG tablet, Take 1 tablet (1,000 mcg total) by mouth once daily., Disp: 90 tablet, Rfl: 1    HUMIRA,CF, PEN 40 mg/0.4 mL PnKt, , Disp: , Rfl:     hydrOXYchloroQUINE (PLAQUENIL) 200 mg tablet, Take 1 tablet (200 mg total) by mouth 2 (two) times daily., Disp: 180 tablet, Rfl: 1    methotrexate 2.5 MG Tab, Take 8 tablets by mouth weekly, Disp: 104 tablet, Rfl: 1    omeprazole (PRILOSEC) 20 MG capsule, Take 20 mg by mouth once daily. , Disp: , Rfl:     predniSONE (DELTASONE) 5 MG  tablet, Take 1 tablet (5 mg total) by mouth once daily. With food., Disp: 90 tablet, Rfl: 0    tamsulosin (FLOMAX) 0.4 mg Cap, Take 2 capsules (0.8 mg total) by mouth once daily., Disp: 180 capsule, Rfl: 1   Past Medical History:   Diagnosis Date    Arthritis     BPH (benign prostatic hyperplasia)     Carpal tunnel syndrome, bilateral     Hidradenitis suppurativa     History of knee replacement     Hypertension     Opioid dependence     Rheumatoid arthritis       Past Surgical History:   Procedure Laterality Date    CARPAL TUNNEL RELEASE Bilateral     KNEE ARTHROSCOPY Left     NECK SURGERY      TONSILLECTOMY      TOTAL KNEE ARTHROPLASTY Left       Family History   Problem Relation Age of Onset    Heart disease Mother     Hypertension Mother     Diabetes Father     Heart disease Father     Diabetes Maternal Grandmother     Diabetes Maternal Grandfather     Diabetes Paternal Grandfather     Melanoma Neg Hx       Social History     Tobacco Use    Smoking status: Every Day     Current packs/day: 1.00     Average packs/day: 1 pack/day for 49.1 years (49.1 ttl pk-yrs)     Types: Cigarettes     Start date: 1975     Passive exposure: Current    Smokeless tobacco: Former   Substance Use Topics    Alcohol use: Never        Review of Systems   Constitutional:  Negative for fatigue and fever.   HENT:  Positive for nasal congestion, rhinorrhea and sinus pressure/congestion. Negative for sore throat.    Respiratory:  Positive for shortness of breath. Negative for cough and wheezing.    Cardiovascular:  Negative for chest pain and palpitations.   Gastrointestinal:  Negative for abdominal pain and blood in stool.   Genitourinary:  Negative for dysuria.   Musculoskeletal:  Negative for back pain and neck pain.   Integumentary:  Negative for rash and mole/lesion.   Neurological:  Negative for dizziness, headaches and memory loss.   Psychiatric/Behavioral:  Negative for agitation. The patient is not nervous/anxious.         Objective:   /68 (BP Location: Left arm, Patient Position: Sitting)   Pulse 105   Temp 98.2 °F (36.8 °C) (Oral)   Resp 16   Ht 6' (1.829 m)   Wt 93.4 kg (206 lb)   SpO2 (!) 91%   BMI 27.94 kg/m²      Physical Exam  Constitutional:       Appearance: Normal appearance.   HENT:      Head: Normocephalic and atraumatic.      Right Ear: External ear normal.      Left Ear: External ear normal.      Nose: Nose normal.   Eyes:      Extraocular Movements: Extraocular movements intact.      Conjunctiva/sclera: Conjunctivae normal.      Pupils: Pupils are equal, round, and reactive to light.   Cardiovascular:      Rate and Rhythm: Normal rate and regular rhythm.      Pulses: Normal pulses.      Heart sounds: Normal heart sounds. No murmur heard.     No friction rub. No gallop.   Pulmonary:      Effort: Pulmonary effort is normal.      Breath sounds: No wheezing, rhonchi or rales.   Abdominal:      General: Abdomen is flat.      Palpations: Abdomen is soft.   Musculoskeletal:      Cervical back: Normal range of motion and neck supple.      Right lower leg: No edema.      Left lower leg: No edema.   Skin:     General: Skin is warm and dry.      Findings: No rash.   Neurological:      General: No focal deficit present.      Mental Status: He is alert and oriented to person, place, and time.      Cranial Nerves: No cranial nerve deficit.   Psychiatric:         Mood and Affect: Mood normal.          Assessment and Plan    Primary hypertension  -     Lipid Panel; Future; Expected date: 01/29/2024  -     Basic Metabolic Panel; Future; Expected date: 01/29/2024  -     POCT EKG 12-LEAD (NOT FOR [facility] USE)    Rheumatoid arthritis involving multiple sites with positive rheumatoid factor    Drug-induced immunodeficiency    Dyspnea on exertion  -     X-Ray Chest PA And Lateral; Future; Expected date: 01/29/2024  -     CBC Auto Differential; Future; Expected date: 01/29/2024  -     NT-Pro Natriuretic Peptide; Future; Expected date:  01/29/2024  -     POCT EKG 12-LEAD (NOT FOR [facility] USE)    Hidradenitis suppurativa         Problem List Items Addressed This Visit          Derm    Hidradenitis suppurativa (Chronic)       Cardiac/Vascular    Hypertension - Primary (Chronic)    Relevant Orders    Lipid Panel    Basic Metabolic Panel    POCT EKG 12-LEAD (NOT FOR [facility] USE)       Immunology/Multi System    Rheumatoid arthritis (Chronic)    Drug-induced immunodeficiency     Other Visit Diagnoses       Dyspnea on exertion        Relevant Orders    X-Ray Chest PA And Lateral (Completed)    CBC Auto Differential    NT-Pro Natriuretic Peptide    POCT EKG 12-LEAD (NOT FOR [facility] USE)         1-Dyspnea-check bnp, chronic changes on CXR (awaiting formal report), discussed need for smoking cessation, can't tolerate chantix.  EKG sinus@100 bpm no ST-T changes  2-RA-cont meds (these meds also help with Hidradenitis suppurativa  3-HTN controlled-labs      Follow up in about 3 months (around 4/29/2024).

## 2024-01-30 DIAGNOSIS — R79.89 ELEVATED BRAIN NATRIURETIC PEPTIDE (BNP) LEVEL: Primary | ICD-10-CM

## 2024-01-30 DIAGNOSIS — R93.89 ABNORMAL CHEST X-RAY: Primary | ICD-10-CM

## 2024-01-30 LAB
EKG 12-LEAD: NORMAL
PR INTERVAL: NORMAL
PRT AXES: NORMAL
QRS DURATION: NORMAL
QT/QTC: NORMAL
VENTRICULAR RATE: NORMAL

## 2024-01-30 RX ORDER — FUROSEMIDE 20 MG/1
20 TABLET ORAL DAILY
Qty: 90 TABLET | Refills: 1 | Status: SHIPPED | OUTPATIENT
Start: 2024-01-30 | End: 2024-05-07

## 2024-01-30 NOTE — PROGRESS NOTES
The radiologist read his CXR as a possible mass behind his heart, he needs a CT chest with and without contrast

## 2024-01-30 NOTE — TELEPHONE ENCOUNTER
----- Message from Harish Puga MD sent at 1/30/2024  5:53 AM CST -----  His BNP is elevated, he needs and echo to evaluate his cardiac function-start lasix 20mg qd

## 2024-02-02 ENCOUNTER — TELEPHONE (OUTPATIENT)
Dept: FAMILY MEDICINE | Facility: CLINIC | Age: 64
End: 2024-02-02
Payer: MEDICARE

## 2024-02-02 ENCOUNTER — HOSPITAL ENCOUNTER (OUTPATIENT)
Dept: RADIOLOGY | Facility: HOSPITAL | Age: 64
Discharge: HOME | End: 2024-02-02
Attending: INTERNAL MEDICINE
Payer: MEDICARE

## 2024-02-02 DIAGNOSIS — R93.89 ABNORMAL CT OF THE CHEST: ICD-10-CM

## 2024-02-02 DIAGNOSIS — R93.89 ABNORMAL CHEST X-RAY: Primary | ICD-10-CM

## 2024-02-02 DIAGNOSIS — R93.89 ABNORMAL CHEST X-RAY: ICD-10-CM

## 2024-02-02 PROCEDURE — 25500020 PHARM REV CODE 255: Mod: PN | Performed by: INTERNAL MEDICINE

## 2024-02-02 PROCEDURE — 71270 CT THORAX DX C-/C+: CPT | Mod: TC,PN

## 2024-02-02 RX ADMIN — IOPAMIDOL 100 ML: 755 INJECTION, SOLUTION INTRAVENOUS at 10:02

## 2024-02-02 NOTE — TELEPHONE ENCOUNTER
----- Message from Harish Puga MD sent at 2/2/2024  2:07 PM CST -----  No mass seen on CT but chronic lung changes, he will need to see Pulmonary

## 2024-02-06 ENCOUNTER — TELEPHONE (OUTPATIENT)
Dept: PULMONOLOGY | Facility: CLINIC | Age: 64
End: 2024-02-06
Payer: MEDICARE

## 2024-02-06 NOTE — TELEPHONE ENCOUNTER
Patient left a vm to call for an appt. Returned call notified patient appt with  will be on 2/21/24 @1pm. Gave directions to the clinic & floor number 2. Patient voiced understanding. No other questions or concerns at this time.

## 2024-02-13 ENCOUNTER — HOSPITAL ENCOUNTER (OUTPATIENT)
Dept: CARDIOLOGY | Facility: HOSPITAL | Age: 64
Discharge: HOME | End: 2024-02-13
Attending: INTERNAL MEDICINE
Payer: MEDICARE

## 2024-02-13 VITALS — BODY MASS INDEX: 27.9 KG/M2 | HEIGHT: 72 IN | WEIGHT: 206 LBS

## 2024-02-13 DIAGNOSIS — R79.89 ELEVATED BRAIN NATRIURETIC PEPTIDE (BNP) LEVEL: ICD-10-CM

## 2024-02-13 PROCEDURE — 93306 TTE W/DOPPLER COMPLETE: CPT | Mod: 26,,, | Performed by: STUDENT IN AN ORGANIZED HEALTH CARE EDUCATION/TRAINING PROGRAM

## 2024-02-13 PROCEDURE — 93306 TTE W/DOPPLER COMPLETE: CPT

## 2024-02-15 LAB
AORTIC ROOT ANNULUS: 4.54 CM
AORTIC VALVE CUSP SEPERATION: 3.03 CM
AV INDEX (PROSTH): 0.66
AV MEAN GRADIENT: 4 MMHG
AV PEAK GRADIENT: 7 MMHG
AV VALVE AREA BY VELOCITY RATIO: 2.39 CM²
AV VALVE AREA: 2.16 CM²
AV VELOCITY RATIO: 0.73
BSA FOR ECHO PROCEDURE: 2.18 M2
CV ECHO LV RWT: 0.36 CM
DOP CALC AO PEAK VEL: 1.31 M/S
DOP CALC AO VTI: 23.6 CM
DOP CALC LVOT AREA: 3.3 CM2
DOP CALC LVOT DIAMETER: 2.04 CM
DOP CALC LVOT PEAK VEL: 0.96 M/S
DOP CALC LVOT STROKE VOLUME: 50.96 CM3
DOP CALCLVOT PEAK VEL VTI: 15.6 CM
E WAVE DECELERATION TIME: 217.52 MSEC
E/A RATIO: 0.71
E/E' RATIO: 5.84 M/S
ECHO LV POSTERIOR WALL: 0.95 CM (ref 0.6–1.1)
FRACTIONAL SHORTENING: 22 % (ref 28–44)
INTERVENTRICULAR SEPTUM: 0.92 CM (ref 0.6–1.1)
IVC DIAMETER: 1.44 CM
LEFT ATRIUM SIZE: 3.4 CM
LEFT INTERNAL DIMENSION IN SYSTOLE: 4.07 CM (ref 2.1–4)
LEFT VENTRICLE DIASTOLIC VOLUME INDEX: 60.76 ML/M2
LEFT VENTRICLE DIASTOLIC VOLUME: 131.25 ML
LEFT VENTRICLE MASS INDEX: 83 G/M2
LEFT VENTRICLE SYSTOLIC VOLUME INDEX: 33.7 ML/M2
LEFT VENTRICLE SYSTOLIC VOLUME: 72.77 ML
LEFT VENTRICULAR INTERNAL DIMENSION IN DIASTOLE: 5.23 CM (ref 3.5–6)
LEFT VENTRICULAR MASS: 179.36 G
LV LATERAL E/E' RATIO: 4.87 M/S
LV SEPTAL E/E' RATIO: 7.3 M/S
LVOT MG: 1.78 MMHG
LVOT MV: 0.64 CM/S
MV PEAK A VEL: 1.03 M/S
MV PEAK E VEL: 0.73 M/S
MV STENOSIS PRESSURE HALF TIME: 63.08 MS
MV VALVE AREA P 1/2 METHOD: 3.49 CM2
OHS LV EJECTION FRACTION SIMPSONS BIPLANE MOD: 60 %
PISA TR MAX VEL: 3.19 M/S
PV PEAK GRADIENT: 3 MMHG
PV PEAK VELOCITY: 0.93 M/S
RA PRESSURE ESTIMATED: 3 MMHG
RIGHT ATRIAL AREA: 17 CM2
RIGHT VENTRICULAR END-DIASTOLIC DIMENSION: 3.2 CM
RIGHT VENTRICULAR LENGTH IN DIASTOLE (APICAL 4-CHAMBER VIEW): 8.48 CM
RV MID DIAMA: 2.81 CM
RV TB RVSP: 6 MMHG
TDI LATERAL: 0.15 M/S
TDI SEPTAL: 0.1 M/S
TDI: 0.13 M/S
TR MAX PG: 41 MMHG
TRICUSPID ANNULAR PLANE SYSTOLIC EXCURSION: 2.02 CM
TV REST PULMONARY ARTERY PRESSURE: 44 MMHG
Z-SCORE OF LEFT VENTRICULAR DIMENSION IN END DIASTOLE: -3.03
Z-SCORE OF LEFT VENTRICULAR DIMENSION IN END SYSTOLE: -0.45

## 2024-02-21 ENCOUNTER — OFFICE VISIT (OUTPATIENT)
Dept: PULMONOLOGY | Facility: CLINIC | Age: 64
End: 2024-02-21
Payer: MEDICARE

## 2024-02-21 VITALS
WEIGHT: 199 LBS | HEIGHT: 72 IN | OXYGEN SATURATION: 90 % | RESPIRATION RATE: 20 BRPM | HEART RATE: 72 BPM | DIASTOLIC BLOOD PRESSURE: 54 MMHG | SYSTOLIC BLOOD PRESSURE: 122 MMHG | BODY MASS INDEX: 26.95 KG/M2

## 2024-02-21 DIAGNOSIS — R06.02 SHORTNESS OF BREATH: Primary | ICD-10-CM

## 2024-02-21 DIAGNOSIS — J84.9 ILD (INTERSTITIAL LUNG DISEASE): ICD-10-CM

## 2024-02-21 DIAGNOSIS — R93.89 ABNORMAL CT OF THE CHEST: ICD-10-CM

## 2024-02-21 PROCEDURE — 4010F ACE/ARB THERAPY RXD/TAKEN: CPT | Mod: CPTII,,, | Performed by: STUDENT IN AN ORGANIZED HEALTH CARE EDUCATION/TRAINING PROGRAM

## 2024-02-21 PROCEDURE — 3078F DIAST BP <80 MM HG: CPT | Mod: CPTII,,, | Performed by: STUDENT IN AN ORGANIZED HEALTH CARE EDUCATION/TRAINING PROGRAM

## 2024-02-21 PROCEDURE — 99204 OFFICE O/P NEW MOD 45 MIN: CPT | Mod: S$PBB,,, | Performed by: STUDENT IN AN ORGANIZED HEALTH CARE EDUCATION/TRAINING PROGRAM

## 2024-02-21 PROCEDURE — 1160F RVW MEDS BY RX/DR IN RCRD: CPT | Mod: CPTII,,, | Performed by: STUDENT IN AN ORGANIZED HEALTH CARE EDUCATION/TRAINING PROGRAM

## 2024-02-21 PROCEDURE — 1159F MED LIST DOCD IN RCRD: CPT | Mod: CPTII,,, | Performed by: STUDENT IN AN ORGANIZED HEALTH CARE EDUCATION/TRAINING PROGRAM

## 2024-02-21 PROCEDURE — 3008F BODY MASS INDEX DOCD: CPT | Mod: CPTII,,, | Performed by: STUDENT IN AN ORGANIZED HEALTH CARE EDUCATION/TRAINING PROGRAM

## 2024-02-21 PROCEDURE — 99215 OFFICE O/P EST HI 40 MIN: CPT | Mod: PBBFAC | Performed by: STUDENT IN AN ORGANIZED HEALTH CARE EDUCATION/TRAINING PROGRAM

## 2024-02-21 PROCEDURE — 3074F SYST BP LT 130 MM HG: CPT | Mod: CPTII,,, | Performed by: STUDENT IN AN ORGANIZED HEALTH CARE EDUCATION/TRAINING PROGRAM

## 2024-02-21 NOTE — PROGRESS NOTES
[facility]  Pulmonology  NEW VISIT     Patient Name:  Shamar Mendez  Primary Care Provider: Harish Puga MD  Date of Service: 02/21/2024  Reason for Referral: Abnormal CT Chest      Chief Complaint: Shortness of breath    SUBJECTIVE   HPI:  Shamar Mendez is a 63 y.o. male with RA, chronic immunosuppression (on MTX, Humira and prednisone) and nicotine dependence who presents today upon referral with complaints of shortness of breath.     Andrea reports having progression in his shortness of breath more significant in the past 3 months.  He describes this as shortness of breath that is present upon waking up, improves with rest and is worse with exertion.  He now has shortness of breath with walking over to his mailbox.  He has a cough that is infrequent and productive of mainly clear saliva.  He has a feeling of congestion that feels like it is stuck at the back of his throat.  Has no hospitalizations for any respiratory symptoms he is aware of.  We discussed the findings of his imaging as well as planned workup as outlined and plan below.     Past Medical History:   Diagnosis Date    Arthritis     BPH (benign prostatic hyperplasia)     Carpal tunnel syndrome, bilateral     Hidradenitis suppurativa     History of knee replacement     Hypertension     Opioid dependence     Rheumatoid arthritis        Past Surgical History:   Procedure Laterality Date    CARPAL TUNNEL RELEASE Bilateral     KNEE ARTHROSCOPY Left     NECK SURGERY      TONSILLECTOMY      TOTAL KNEE ARTHROPLASTY Left        Family History   Problem Relation Age of Onset    Heart disease Mother     Hypertension Mother     Diabetes Father     Heart disease Father     Diabetes Maternal Grandmother     Diabetes Maternal Grandfather     Diabetes Paternal Grandfather     Melanoma Neg Hx         Social History     Socioeconomic History    Marital status:    Tobacco Use    Smoking status: Every Day     Current packs/day: 1.00      Average packs/day: 1 pack/day for 49.1 years (49.1 ttl pk-yrs)     Types: Cigarettes     Start date: 1975     Passive exposure: Current    Smokeless tobacco: Former   Substance and Sexual Activity    Alcohol use: Never       Social History     Social History Narrative    Not on file       Review of patient's allergies indicates:   Allergen Reactions    Wellbutrin [bupropion hcl] Other (See Comments)     Mental status changes        Medications: Medications reviewed to include over the counter medications.    Review of Systems: A focused ROS was completed and found to be negative except for that mentioned above.      OBJECTIVE   PHYSICAL EXAM:  Vitals:    02/21/24 1309   BP: (!) 122/54   BP Location: Left arm   Patient Position: Sitting   BP Method: Large (Manual)   Pulse: 72   Resp: 20   SpO2: (!) 90%   Weight: 90.3 kg (199 lb)   Height: 6' (1.829 m)        GENERAL: NAD  RESPIRATORY: clear to auscultation, no wheezing, rales or rhonchi  CARDIOVASCULAR: Regular rate and rhythm, no murmurs rubs or gallops.  SKIN: no rash, jaundice, ecchymosis or ulcers  MUSCULOSKELETAL: No clubbing or cyanosis; no pedal edema, ulnar deviation in bilateral hands more prominent in L hand  NEUROLOGIC: AO ×3, no gross deficits  PSYCH: Normal mood and affect    LABS:  Lab studies reviewed and notable for serum creatinine 1 -1.5, CO2 24-27, H/H 15.1/45.9, Plts 221    IMAGING:  Imaging reviewed and notable for CT Chest 02/2024 bilateral upper lobe emphysema (centrilobular, paraseptal and panacinar), reticulation of the lung parenchyma in bilateral peripheral lung fields with mid and lower lung predominance, honeycombing and associated traction bronchiectasis present; official report reviewed as well. Serial CXR reviewed from 05/2020, 10/2014, 07/2021 and 09/2021 with progression in interstitial pulmonary densities     TTE:  02/2024:  LVEF 60-65%, normal diastolic function, RV size normal, TAPSE 2.02, normal LA size, normal RA size, mild MR,  mild TR, PASP 44    LUNG FUNCTION TESTING: None available to review or report      ASSESSMENT & PLAN     1. Shortness of breath  Assessment & Plan:  62 yo M with ongoing nicotine dependence and RA on chronic immunosuppression presents for evaluation of progressive shortness of breath with predominant exertional symptoms. Assessment notable for low normal SpO2 at rest, imaging with concern for DPLD and TTE with pEF and PASP elevation. Plan for evaluation as per ILD plan. In addition, will refer to Cardiology for RHC to assess for pulmonary hypertension.   - discussed at length pulmonary hypertension diagnosis with right heart catheterization. Risks of right heart catheterization discussed including insertion site injury (artery, vein and nerve), hemoptysis, worsening of TR and arrhythmia. Reading material was provided as well; Long is amenable to diagnostic procedure  - obtain 6MWT to assess for rest and exertional hypoxia    Orders:  -     Complete PFT w/ bronchodilator; Future  -     Stress test, pulmonary; Future    2. Abnormal CT of the chest  -     Ambulatory referral/consult to Pulmonology    3. ILD (interstitial lung disease)  Assessment & Plan:  CXR with progressive interstitial reticular opacification and now with CT Chest 02/2024 with emphysema and DPLD w/ honeycombing, traction bronchiectasis and reticulations. Imaging is not convincing for solely alveolar enlargement process. Will obtain PFTs to better classify DPLD outside of invasive testing in this patient with longstanding RA. Differential does include CPFE vs IPF vs UIP. Medication side effect is least likely given progression in image findings preceding initiation of MTX and adalimumab. Future considerations may include biopsy for unequivocal non invasive work up.              Follow up in about 3 weeks (around 3/13/2024).      Case was discussed with patient; all questions were answered to patient's satisfaction and patient verbalized  daria.     Addie Hernandez MD  Pulmonary Medicine  [facility]  Phone: [phone]

## 2024-02-21 NOTE — ASSESSMENT & PLAN NOTE
CXR with progressive interstitial reticular opacification and now with CT Chest 02/2024 with emphysema and DPLD w/ honeycombing, traction bronchiectasis and reticulations. Imaging is not convincing for solely alveolar enlargement process. Will obtain PFTs to better classify DPLD outside of invasive testing in this patient with longstanding RA. Differential does include CPFE vs IPF vs UIP. Medication side effect is least likely given progression in image findings preceding initiation of MTX and adalimumab. Future considerations may include biopsy for unequivocal non invasive work up.

## 2024-02-22 NOTE — ASSESSMENT & PLAN NOTE
64 yo M with ongoing nicotine dependence and RA on chronic immunosuppression presents for evaluation of progressive shortness of breath with predominant exertional symptoms. Assessment notable for low normal SpO2 at rest, imaging with concern for DPLD and TTE with pEF and PASP elevation. Plan for evaluation as per ILD plan. In addition, will refer to Cardiology for RHC to assess for pulmonary hypertension.   - discussed at length pulmonary hypertension diagnosis with right heart catheterization. Risks of right heart catheterization discussed including insertion site injury (artery, vein and nerve), hemoptysis, worsening of TR and arrhythmia. Reading material was provided as well; Long is amenable to diagnostic procedure  - obtain 6MWT to assess for rest and exertional hypoxia

## 2024-03-01 ENCOUNTER — CLINICAL SUPPORT (OUTPATIENT)
Dept: PULMONOLOGY | Facility: HOSPITAL | Age: 64
End: 2024-03-01
Attending: INTERNAL MEDICINE
Payer: MEDICARE

## 2024-03-01 VITALS — BODY MASS INDEX: 26.95 KG/M2 | OXYGEN SATURATION: 91 % | HEIGHT: 72 IN | WEIGHT: 199 LBS

## 2024-03-01 DIAGNOSIS — R06.02 SHORTNESS OF BREATH: ICD-10-CM

## 2024-03-01 PROCEDURE — 94618 PULMONARY STRESS TESTING: CPT | Mod: 26,,, | Performed by: STUDENT IN AN ORGANIZED HEALTH CARE EDUCATION/TRAINING PROGRAM

## 2024-03-01 PROCEDURE — 94618 PULMONARY STRESS TESTING: CPT

## 2024-03-01 PROCEDURE — 94729 DIFFUSING CAPACITY: CPT | Mod: 26,,, | Performed by: STUDENT IN AN ORGANIZED HEALTH CARE EDUCATION/TRAINING PROGRAM

## 2024-03-01 PROCEDURE — 94060 EVALUATION OF WHEEZING: CPT

## 2024-03-01 PROCEDURE — 94726 PLETHYSMOGRAPHY LUNG VOLUMES: CPT | Mod: 26,,, | Performed by: STUDENT IN AN ORGANIZED HEALTH CARE EDUCATION/TRAINING PROGRAM

## 2024-03-01 PROCEDURE — 94726 PLETHYSMOGRAPHY LUNG VOLUMES: CPT

## 2024-03-01 PROCEDURE — 27100098 HC SPACER

## 2024-03-01 PROCEDURE — 94060 EVALUATION OF WHEEZING: CPT | Mod: 26,59,, | Performed by: STUDENT IN AN ORGANIZED HEALTH CARE EDUCATION/TRAINING PROGRAM

## 2024-03-01 PROCEDURE — 94729 DIFFUSING CAPACITY: CPT

## 2024-03-01 NOTE — PROGRESS NOTES
TEST 1  PT WALKED 72 FEET, SATS DROPPED TO 84%. APPLIED NC 1 LPM-84-85%,   2 LPM 84-86%.   2.5 LPM 84-87%  PT MAINTAINED SATS 89-91% ON 3.5 LPM.

## 2024-03-04 DIAGNOSIS — J84.9 ILD (INTERSTITIAL LUNG DISEASE): ICD-10-CM

## 2024-03-04 DIAGNOSIS — J96.11 CHRONIC RESPIRATORY FAILURE WITH HYPOXIA: Primary | ICD-10-CM

## 2024-03-04 NOTE — PROCEDURES
PFT REPORT:  SPIROMETRY:  The pre-BD FVC is normal, 4.29L/-0.70 Z score.  The pre-BD FEV1 is normal, 2.71L/-1.63 Z score.  Thre pre-BD FEV1/FVC ratio is 63/-1.71 Z score.    The post-BD FVC is normal, 4.13L/-0.92 Z score.  The post-BD FEV1 is normal, 2.72L/-1.62 Z score.  The post-BD FEV1/FVC ratio is 66/-1.34 Z score.    The is no significant bronchodilator effect.  There is scooping out of the expiratory limb of the flow volume loop.     LUNG VOLUMES:  The TLC is normal, 7.26L/-0.16 Z score.  The FRC is normal, 4.60L/0.76 Z score.  The RV is normal, 2.97L/1.15 Z score.  RV/%    DIFFUSION CAPACITY:  The diffusion capacity is corrected for hemoglobin and is decreased, 7.38/-6.22 Z score.    Interpretation:  The post-BD FEV1/FVC ratio is low based on GOLD guidelines for COPD and normal based on the lower limit of normal (LLN) cutoff. In a patient with risk factors for COPD, would consider COPD. If COPD is not suspected, ratio is likely within normal range without obstruction. If COPD is suspected, the post-BD spirometry shoes a moderate obstructive defect.    There is no significant response to bronchodilators. Lack of a bronchodilator response in the lab does not predict clinical response to bronchodilator therapy.  There is scooping out of the expiratory limb of the flow volume loop  consistent with obstruction.  There is no restrictive defect. Hyperinflation is present.  The diffusion capacity met repeatability criteria. There is a severe diffusion defect. Decreased diffusion capacity is suggestive of pulmonary vascular disease, emphysema, interstitial lung disease and/or anemia. Clinical correlation is advised.       Addie Hernandez MD  Pulmonary Medicine  [facility]

## 2024-03-04 NOTE — PROCEDURES
SIX MINUTE WALK DISTANCE  BASELINE VITALS  HR: 114   BP: 114/80  SPO2: 91% on RA    END OF STUDY VITALS:  HR: 143  BP: 135/92  SPO2: 89% on 3.5L NC    RECOVERY VITALS:  After 1 min rest  HR: 118  BP: 129/93  SPO2: 94% on 3.5L NC    Patient walked 1104 ft with 0 rests.   SpO2 ashley was 84% at 3 minutes.   The six minute walk test was repeated on 3.5L NC with SpO2 ashley 88% at 3 minutes    IMPRESSION:  Patient has tachycardia at rest which increases with ambulation. After rest, he remains tachycardic at pre-test baseline. Patient needs 4L NC with activity.      Addie Hernandez MD  Pulmonary and Critical Care  [facility]

## 2024-03-14 ENCOUNTER — OFFICE VISIT (OUTPATIENT)
Dept: PULMONOLOGY | Facility: CLINIC | Age: 64
End: 2024-03-14
Payer: MEDICARE

## 2024-03-14 VITALS
HEIGHT: 72 IN | HEART RATE: 103 BPM | WEIGHT: 199 LBS | DIASTOLIC BLOOD PRESSURE: 78 MMHG | BODY MASS INDEX: 26.95 KG/M2 | OXYGEN SATURATION: 93 % | SYSTOLIC BLOOD PRESSURE: 119 MMHG

## 2024-03-14 DIAGNOSIS — J84.9 ILD (INTERSTITIAL LUNG DISEASE): Primary | ICD-10-CM

## 2024-03-14 DIAGNOSIS — J96.11 CHRONIC RESPIRATORY FAILURE WITH HYPOXIA: ICD-10-CM

## 2024-03-14 DIAGNOSIS — R06.02 SHORTNESS OF BREATH: ICD-10-CM

## 2024-03-14 PROCEDURE — 1159F MED LIST DOCD IN RCRD: CPT | Mod: CPTII,,, | Performed by: STUDENT IN AN ORGANIZED HEALTH CARE EDUCATION/TRAINING PROGRAM

## 2024-03-14 PROCEDURE — 3074F SYST BP LT 130 MM HG: CPT | Mod: CPTII,,, | Performed by: STUDENT IN AN ORGANIZED HEALTH CARE EDUCATION/TRAINING PROGRAM

## 2024-03-14 PROCEDURE — 3008F BODY MASS INDEX DOCD: CPT | Mod: CPTII,,, | Performed by: STUDENT IN AN ORGANIZED HEALTH CARE EDUCATION/TRAINING PROGRAM

## 2024-03-14 PROCEDURE — 4010F ACE/ARB THERAPY RXD/TAKEN: CPT | Mod: CPTII,,, | Performed by: STUDENT IN AN ORGANIZED HEALTH CARE EDUCATION/TRAINING PROGRAM

## 2024-03-14 PROCEDURE — 99214 OFFICE O/P EST MOD 30 MIN: CPT | Mod: PBBFAC | Performed by: STUDENT IN AN ORGANIZED HEALTH CARE EDUCATION/TRAINING PROGRAM

## 2024-03-14 PROCEDURE — 3078F DIAST BP <80 MM HG: CPT | Mod: CPTII,,, | Performed by: STUDENT IN AN ORGANIZED HEALTH CARE EDUCATION/TRAINING PROGRAM

## 2024-03-14 PROCEDURE — 99214 OFFICE O/P EST MOD 30 MIN: CPT | Mod: S$PBB,,, | Performed by: STUDENT IN AN ORGANIZED HEALTH CARE EDUCATION/TRAINING PROGRAM

## 2024-03-14 NOTE — ASSESSMENT & PLAN NOTE
64 yo M with ongoing nicotine dependence and RA on chronic immunosuppression (MTX, Humira and prednisone) presents for evaluation of progressive shortness of breath with predominant exertional symptoms. Imaging with concern for DPLD and TTE with pEF and PASP 44 now found to have exertional hypoxia on 6MWD and PFT with severe diffusion deficit that appears out of proportion with imaging findings and concerning for PAH overlap. Pending RHC per Cardiology; tentatively 04/01.   - reviewed again goals of RHC; discussed at length pulmonary hypertension evaluation with right heart catheterization. Risks of right heart catheterization discussed including insertion site injury (artery, vein and nerve), hemoptysis, worsening of TR and arrhythmia. Reading material was provided as well; Long is amenable to diagnostic procedure

## 2024-03-14 NOTE — PROGRESS NOTES
[facility]  Pulmonology  ESTABLISHED VISIT     Patient Name:  Shamar Mendez  Primary Care Provider: Harish Puga MD  Date of Service: 03/14/2024    Chief Complaint: Shortness of breath    SUBJECTIVE   HPI:  Shamar Mendez is a 63 y.o. male with RA, chronic immunosuppression (on MTX, Humira and prednisone) and nicotine dependence who presents to follow up on shortness of breath. Last seen     Andrea received his oxygen 2 days ago. He has noticed improvement in his sleeping with his oxygen. He can do much more in the past 2 days while on oxygen. He is pending scheduling of RHC.     Initial HPI  Andrea reports having progression in his shortness of breath more significant in the past 3 months.  He describes this as shortness of breath that is present upon waking up, improves with rest and is worse with exertion.  He now has shortness of breath with walking over to his mailbox.  He has a cough that is infrequent and productive of mainly clear saliva.  He has a feeling of congestion that feels like it is stuck at the back of his throat.  Has no hospitalizations for any respiratory symptoms he is aware of.  We discussed the findings of his imaging as well as planned workup as outlined and plan below.     Past Medical History:   Diagnosis Date    Arthritis     BPH (benign prostatic hyperplasia)     Carpal tunnel syndrome, bilateral     Hidradenitis suppurativa     History of knee replacement     Hypertension     Opioid dependence     Rheumatoid arthritis        Past Surgical History:   Procedure Laterality Date    CARPAL TUNNEL RELEASE Bilateral     KNEE ARTHROSCOPY Left     NECK SURGERY      TONSILLECTOMY      TOTAL KNEE ARTHROPLASTY Left        Family History   Problem Relation Age of Onset    Heart disease Mother     Hypertension Mother     Diabetes Father     Heart disease Father     Diabetes Maternal Grandmother     Diabetes Maternal Grandfather     Diabetes Paternal Grandfather     Melanoma Neg Hx          Social History     Socioeconomic History    Marital status:    Tobacco Use    Smoking status: Every Day     Current packs/day: 1.00     Average packs/day: 1 pack/day for 49.2 years (49.2 ttl pk-yrs)     Types: Cigarettes     Start date: 1975     Passive exposure: Current    Smokeless tobacco: Former   Substance and Sexual Activity    Alcohol use: Never       Social History     Social History Narrative    Not on file       Review of patient's allergies indicates:   Allergen Reactions    Wellbutrin [bupropion hcl] Other (See Comments)     Mental status changes        Medications: Medications reviewed to include over the counter medications.    Review of Systems: A focused ROS was completed and found to be negative except for that mentioned above.      OBJECTIVE   PHYSICAL EXAM:  Vitals:    03/14/24 1416   BP: 119/78   BP Location: Right arm   Patient Position: Sitting   BP Method: Large (Automatic)   Pulse: 103   SpO2: (!) 93%   Weight: 90.3 kg (199 lb)   Height: 6' (1.829 m)        GENERAL: NAD  RESPIRATORY: clear to auscultation, no wheezing, rales or rhonchi  CARDIOVASCULAR: Regular rate and rhythm, no murmurs rubs or gallops.  SKIN: no rash, jaundice, ecchymosis or ulcers  MUSCULOSKELETAL: No clubbing or cyanosis; no pedal edema, ulnar deviation in bilateral hands more prominent in L hand  NEUROLOGIC: AO ×3, no gross deficits  PSYCH: Normal mood and affect    LABS:  Lab studies reviewed and notable for serum creatinine 1 -1.5, CO2 24-27, H/H 15.1/45.9, Plts 221    IMAGING:  Imaging reviewed and notable for CT Chest 02/2024 bilateral upper lobe emphysema (centrilobular, paraseptal and panacinar), reticulation of the lung parenchyma in bilateral peripheral lung fields with mid and lower lung predominance, honeycombing and associated traction bronchiectasis present; official report reviewed as well. Serial CXR reviewed from 05/2020, 10/2014, 07/2021 and 09/2021 with progression in interstitial  pulmonary densities     TTE:  02/2024:  LVEF 60-65%, normal diastolic function, RV size normal, TAPSE 2.02, normal LA size, normal RA size, mild MR, mild TR, PASP 44    LUNG FUNCTION TESTING:   SPIROMETRY/PFT:  03/2024 Pre Post   FVC 4.29/-0.70 4.13/-0.92   FEV1 2.71/-1.63 2.72/-1.62   FEV1/FVC 63/-1.71 66/-1.34   TLC 7.26/-0.16    FRC  4.60/0.76    RV 2.97/1.15    DLCO 7.38/-6.22    The diffusion capacity met repeatability criteria. There is a severe diffusion defect.     6MWD:  Date Distance (ft) Resting SpO2; Renaot SpO2 O2 Required   03/2024 1104 91%, RA; 84% 3.5L                 ASSESSMENT & PLAN     1. ILD (interstitial lung disease)  Assessment & Plan:  CXR with progressive interstitial reticular opacification and now with CT Chest 02/2024 with DPLD characterized by honeycombing, traction bronchiectasis and reticulations. Imaging is not convincing for solely alveolar enlargement process in this patient with nicotine dependence. Now with PFT 03/2024 with severe diffusion deficit and exertional hypoxemia on 6MWT. Differential does include CPFE vs IPF vs UIP. Medication side effect is least likely given progression in image findings preceding initiation of MTX and adalimumab. Future considerations may include biopsy for unequivocal non invasive work up.       2. Shortness of breath  Assessment & Plan:  62 yo M with ongoing nicotine dependence and RA on chronic immunosuppression (MTX, Humira and prednisone) presents for evaluation of progressive shortness of breath with predominant exertional symptoms. Imaging with concern for DPLD and TTE with pEF and PASP 44 now found to have exertional hypoxia on 6MWD and PFT with severe diffusion deficit that appears out of proportion with imaging findings and concerning for PAH overlap. Pending RHC per Cardiology; tentatively 04/01.   - reviewed again goals of RHC; discussed at length pulmonary hypertension evaluation with right heart catheterization. Risks of right heart  catheterization discussed including insertion site injury (artery, vein and nerve), hemoptysis, worsening of TR and arrhythmia. Reading material was provided as well; Long is amenable to diagnostic procedure      3. Chronic respiratory failure with hypoxia  Assessment & Plan:  6MWT with exertional hypoxia. Concern for pulmonary vascular disease. Cont NC supplementation with 3-4L with exertion. Improved exercise capacity with NC.               Follow up in about 1 month (around 4/15/2024).      Case was discussed with patient; all questions were answered to patient's satisfaction and patient verbalized understanding.     Addie Hernandez MD  Pulmonary Medicine  [facility]  Phone: [phone]

## 2024-03-14 NOTE — ASSESSMENT & PLAN NOTE
6MWT with exertional hypoxia. Concern for pulmonary vascular disease. Cont NC supplementation with 3-4L with exertion. Improved exercise capacity with NC.

## 2024-03-14 NOTE — ASSESSMENT & PLAN NOTE
CXR with progressive interstitial reticular opacification and now with CT Chest 02/2024 with DPLD characterized by honeycombing, traction bronchiectasis and reticulations. Imaging is not convincing for solely alveolar enlargement process in this patient with nicotine dependence. Now with PFT 03/2024 with severe diffusion deficit and exertional hypoxemia on 6MWT. Differential does include CPFE vs IPF vs UIP. Medication side effect is least likely given progression in image findings preceding initiation of MTX and adalimumab. Future considerations may include biopsy for unequivocal non invasive work up.

## 2024-03-17 DIAGNOSIS — I10 PRIMARY HYPERTENSION: Chronic | ICD-10-CM

## 2024-03-18 RX ORDER — AMLODIPINE AND BENAZEPRIL HYDROCHLORIDE 10; 40 MG/1; MG/1
1 CAPSULE ORAL DAILY
Qty: 90 CAPSULE | Refills: 1 | Status: SHIPPED | OUTPATIENT
Start: 2024-03-18

## 2024-04-10 DIAGNOSIS — R06.02 SOB (SHORTNESS OF BREATH): Primary | ICD-10-CM

## 2024-04-10 RX ORDER — SODIUM CHLORIDE 0.9 % (FLUSH) 0.9 %
2 SYRINGE (ML) INJECTION
Status: CANCELLED | OUTPATIENT
Start: 2024-04-26

## 2024-04-19 DIAGNOSIS — Z01.818 PREOP EXAMINATION: Primary | ICD-10-CM

## 2024-04-26 ENCOUNTER — HOSPITAL ENCOUNTER (OUTPATIENT)
Facility: HOSPITAL | Age: 64
Discharge: HOME | End: 2024-04-26
Attending: STUDENT IN AN ORGANIZED HEALTH CARE EDUCATION/TRAINING PROGRAM | Admitting: STUDENT IN AN ORGANIZED HEALTH CARE EDUCATION/TRAINING PROGRAM
Payer: MEDICARE

## 2024-04-26 VITALS
TEMPERATURE: 98 F | HEIGHT: 72 IN | SYSTOLIC BLOOD PRESSURE: 121 MMHG | DIASTOLIC BLOOD PRESSURE: 73 MMHG | OXYGEN SATURATION: 97 % | WEIGHT: 199 LBS | BODY MASS INDEX: 26.95 KG/M2 | HEART RATE: 75 BPM | RESPIRATION RATE: 20 BRPM

## 2024-04-26 DIAGNOSIS — R06.02 SOB (SHORTNESS OF BREATH): ICD-10-CM

## 2024-04-26 DIAGNOSIS — Z01.818 PREOP EXAMINATION: ICD-10-CM

## 2024-04-26 PROBLEM — I27.20 MILD PULMONARY HYPERTENSION: Status: ACTIVE | Noted: 2024-04-26

## 2024-04-26 PROCEDURE — 93010 ELECTROCARDIOGRAM REPORT: CPT | Mod: ,,, | Performed by: STUDENT IN AN ORGANIZED HEALTH CARE EDUCATION/TRAINING PROGRAM

## 2024-04-26 PROCEDURE — 63600175 PHARM REV CODE 636 W HCPCS: Performed by: STUDENT IN AN ORGANIZED HEALTH CARE EDUCATION/TRAINING PROGRAM

## 2024-04-26 PROCEDURE — 99152 MOD SED SAME PHYS/QHP 5/>YRS: CPT | Performed by: STUDENT IN AN ORGANIZED HEALTH CARE EDUCATION/TRAINING PROGRAM

## 2024-04-26 PROCEDURE — C1751 CATH, INF, PER/CENT/MIDLINE: HCPCS | Performed by: STUDENT IN AN ORGANIZED HEALTH CARE EDUCATION/TRAINING PROGRAM

## 2024-04-26 PROCEDURE — A4216 STERILE WATER/SALINE, 10 ML: HCPCS | Performed by: STUDENT IN AN ORGANIZED HEALTH CARE EDUCATION/TRAINING PROGRAM

## 2024-04-26 PROCEDURE — 93005 ELECTROCARDIOGRAM TRACING: CPT | Mod: 59

## 2024-04-26 PROCEDURE — 93451 RIGHT HEART CATH: CPT | Performed by: STUDENT IN AN ORGANIZED HEALTH CARE EDUCATION/TRAINING PROGRAM

## 2024-04-26 PROCEDURE — 25000003 PHARM REV CODE 250: Performed by: STUDENT IN AN ORGANIZED HEALTH CARE EDUCATION/TRAINING PROGRAM

## 2024-04-26 PROCEDURE — C1894 INTRO/SHEATH, NON-LASER: HCPCS | Performed by: STUDENT IN AN ORGANIZED HEALTH CARE EDUCATION/TRAINING PROGRAM

## 2024-04-26 PROCEDURE — 99152 MOD SED SAME PHYS/QHP 5/>YRS: CPT | Mod: ,,, | Performed by: STUDENT IN AN ORGANIZED HEALTH CARE EDUCATION/TRAINING PROGRAM

## 2024-04-26 PROCEDURE — 93451 RIGHT HEART CATH: CPT | Mod: 26,,, | Performed by: STUDENT IN AN ORGANIZED HEALTH CARE EDUCATION/TRAINING PROGRAM

## 2024-04-26 RX ORDER — LIDOCAINE HYDROCHLORIDE 10 MG/ML
INJECTION INFILTRATION; PERINEURAL
Status: DISCONTINUED | OUTPATIENT
Start: 2024-04-26 | End: 2024-04-26 | Stop reason: HOSPADM

## 2024-04-26 RX ORDER — SODIUM CHLORIDE 0.9 % (FLUSH) 0.9 %
2 SYRINGE (ML) INJECTION
Status: DISCONTINUED | OUTPATIENT
Start: 2024-04-26 | End: 2024-04-26 | Stop reason: HOSPADM

## 2024-04-26 RX ORDER — FENTANYL CITRATE 50 UG/ML
INJECTION, SOLUTION INTRAMUSCULAR; INTRAVENOUS
Status: DISCONTINUED | OUTPATIENT
Start: 2024-04-26 | End: 2024-04-26 | Stop reason: HOSPADM

## 2024-04-26 RX ORDER — SODIUM CHLORIDE 0.9 % (FLUSH) 0.9 %
SYRINGE (ML) INJECTION
Status: DISCONTINUED | OUTPATIENT
Start: 2024-04-26 | End: 2024-04-26 | Stop reason: HOSPADM

## 2024-04-26 RX ORDER — MIDAZOLAM HYDROCHLORIDE 1 MG/ML
INJECTION INTRAMUSCULAR; INTRAVENOUS
Status: DISCONTINUED | OUTPATIENT
Start: 2024-04-26 | End: 2024-04-26 | Stop reason: HOSPADM

## 2024-04-26 NOTE — H&P
[facility] - Cath Lab  Cardiology  History and Physical     Patient Name: Shamar Mendez  MRN: [id number]  Admission Date: 4/26/2024  Code Status: Full Code   Attending Provider: Sage Hanna MD   Primary Care Physician: Harish Puga MD  Principal Problem:<principal problem not specified>    Patient information was obtained from patient and ER records.     Subjective:     Chief Complaint:  shortness of breath     HPI: Shamar Mendez is a 63 y.o. male with RA, chronic immunosuppression (on MTX, Humira and prednisone) and nicotine dependence who presents today upon referral with complaints of shortness of breath.      Andrea reports having progression in his shortness of breath more significant in the past 3 months.  He describes this as shortness of breath that is present upon waking up, improves with rest and is worse with exertion.  He now has shortness of breath with walking over to his mailbox.  He has a cough that is infrequent and productive of mainly clear saliva.  He has a feeling of congestion that feels like it is stuck at the back of his throat.  Has no hospitalizations for any respiratory symptoms he is aware of.  We discussed the findings of his imaging as well as planned workup as outlined and plan below.     Today, 4/26/24 - patient presents for RHC for evaluation of pulmonary HTN.     Past Medical History:   Diagnosis Date    Arthritis     BPH (benign prostatic hyperplasia)     Carpal tunnel syndrome, bilateral     Hidradenitis suppurativa     History of knee replacement     Hypertension     Opioid dependence     Rheumatoid arthritis        Past Surgical History:   Procedure Laterality Date    CARPAL TUNNEL RELEASE Bilateral     KNEE ARTHROSCOPY Left     NECK SURGERY      TONSILLECTOMY      TOTAL KNEE ARTHROPLASTY Left        Review of patient's allergies indicates:   Allergen Reactions    Wellbutrin [bupropion hcl] Other (See Comments)     Mental status changes       Current  Facility-Administered Medications   Medication Dose Route Frequency Provider Last Rate Last Admin    sodium chloride 0.9% flush 2 mL  2 mL Intravenous Sage Saldaña MD         Family History       Problem Relation (Age of Onset)    Diabetes Father, Maternal Grandmother, Maternal Grandfather, Paternal Grandfather    Heart disease Mother, Father    Hypertension Mother          Tobacco Use    Smoking status: Every Day     Current packs/day: 1.00     Average packs/day: 1 pack/day for 49.3 years (49.3 ttl pk-yrs)     Types: Cigarettes     Start date: 1975     Passive exposure: Current    Smokeless tobacco: Former   Substance and Sexual Activity    Alcohol use: Never    Drug use: Not on file    Sexual activity: Not on file     Review of Systems   Constitutional: Negative for diaphoresis and fever.   Cardiovascular:  Negative for chest pain, claudication, cyanosis, dyspnea on exertion, irregular heartbeat, leg swelling, near-syncope, orthopnea, palpitations, paroxysmal nocturnal dyspnea and syncope.   Respiratory:  Negative for shortness of breath.    All other systems reviewed and are negative.    Objective:     Vital Signs (Most Recent):    Vital Signs (24h Range):           There is no height or weight on file to calculate BMI.            No intake or output data in the 24 hours ending 04/26/24 0836    Lines/Drains/Airways       None                   Physical Exam  Vitals and nursing note reviewed.   Constitutional:       Appearance: Normal appearance.   HENT:      Head: Normocephalic and atraumatic.      Right Ear: External ear normal.      Left Ear: External ear normal.      Nose: Nose normal.      Mouth/Throat:      Mouth: Mucous membranes are dry.      Pharynx: Oropharynx is clear.   Eyes:      Extraocular Movements: Extraocular movements intact.      Conjunctiva/sclera: Conjunctivae normal.   Cardiovascular:      Rate and Rhythm: Normal rate and regular rhythm.      Pulses: Normal pulses.      Heart  "sounds: Normal heart sounds.   Pulmonary:      Effort: Pulmonary effort is normal.      Breath sounds: Normal breath sounds.   Abdominal:      General: Abdomen is flat.      Palpations: Abdomen is soft.   Musculoskeletal:         General: Normal range of motion.      Cervical back: Normal range of motion and neck supple.      Right lower leg: No edema.      Left lower leg: No edema.   Skin:     General: Skin is warm.      Capillary Refill: Capillary refill takes less than 2 seconds.   Neurological:      General: No focal deficit present.      Mental Status: He is alert. Mental status is at baseline.   Psychiatric:         Mood and Affect: Mood normal.         Behavior: Behavior normal.         Significant Labs: BMP: No results for input(s): "GLU", "NA", "K", "CL", "CO2", "BUN", "CREATININE", "CALCIUM", "MG" in the last 48 hours., CMP No results for input(s): "NA", "K", "CL", "CO2", "GLU", "BUN", "CREATININE", "CALCIUM", "PROT", "ALBUMIN", "BILITOT", "ALKPHOS", "AST", "ALT", "ANIONGAP", "ESTGFRAFRICA", "EGFRNONAA" in the last 48 hours., and CBC No results for input(s): "WBC", "HGB", "HCT", "PLT" in the last 48 hours.    Significant Imaging: Echocardiogram: Transthoracic echo (TTE) complete (Cupid Only):   Results for orders placed or performed during the hospital encounter of 02/13/24   Echo   Result Value Ref Range    BSA 2.18 m2    LVOT stroke volume 50.96 cm3    LVIDd 5.23 3.5 - 6.0 cm    LV Systolic Volume 72.77 mL    LV Systolic Volume Index 33.7 mL/m2    LVIDs 4.07 (A) 2.1 - 4.0 cm    LV Diastolic Volume 131.25 mL    LV Diastolic Volume Index 60.76 mL/m2    IVS 0.92 0.6 - 1.1 cm    LVOT diameter 2.04 cm    LVOT area 3.3 cm2    FS 22 (A) 28 - 44 %    Left Ventricle Relative Wall Thickness 0.36 cm    Posterior Wall 0.95 0.6 - 1.1 cm    LV mass 179.36 g    LV Mass Index 83 g/m2    MV Peak E Dash 0.73 m/s    TDI LATERAL 0.15 m/s    TDI SEPTAL 0.10 m/s    E/E' ratio 5.84 m/s    MV Peak A Dash 1.03 m/s    TR Max Dash " 3.19 m/s    E/A ratio 0.71     E wave deceleration time 217.52 msec    LV SEPTAL E/E' RATIO 7.30 m/s    LV LATERAL E/E' RATIO 4.87 m/s    LVOT peak tim 0.96 m/s    Left Ventricular Outflow Tract Mean Velocity 0.64 cm/s    Left Ventricular Outflow Tract Mean Gradient 1.78 mmHg    Right ventricular length in diastole (apical 4-chamber view) 8.48 cm    RV mid diameter 2.81 cm    TAPSE 2.02 cm    RA area 17.0 cm2    AV mean gradient 4 mmHg    AV peak gradient 7 mmHg    Ao peak tim 1.31 m/s    Ao VTI 23.60 cm    LVOT peak VTI 15.60 cm    AV valve area 2.16 cm²    AV Velocity Ratio 0.73     AV index (prosthetic) 0.66     JI by Velocity Ratio 2.39 cm²    MV stenosis pressure 1/2 time 63.08 ms    MV valve area p 1/2 method 3.49 cm2    Triscuspid Valve Regurgitation Peak Gradient 41 mmHg    PV PEAK VELOCITY 0.93 m/s    PV peak gradient 3 mmHg    Ao root annulus 4.54 cm    IVC diameter 1.44 cm    Mean e' 0.13 m/s    ZLVIDS -0.45     ZLVIDD -3.03     AORTIC VALVE CUSP SEPERATION 3.03 cm    RVDD 3.20 cm    LA size 3.4 cm    Waters's Biplane MOD Ejection Fraction 60 %    TV resting pulmonary artery pressure 44 mmHg    RV TB RVSP 6 mmHg    Est. RA pres 3 mmHg    Narrative      Left Ventricle: The left ventricle is normal in size. Normal wall   thickness. Normal wall motion. There is normal systolic function with a   visually estimated ejection fraction of 60 - 65%. Biplane (2D) method of   discs ejection fraction is 60%. There is normal diastolic function.    Right Ventricle: Normal right ventricular cavity size. Systolic   function is normal.    Mitral Valve: There is mild regurgitation.    Tricuspid Valve: There is mild regurgitation.    Pulmonary Artery: The estimated pulmonary artery systolic pressure is   44 mmHg.    IVC/SVC: Normal venous pressure at 3 mmHg.       Assessment and Plan:     There are no hospital problems to display for this patient.      VTE Risk Mitigation (From admission, onward)      None        62 yo M  with ongoing nicotine dependence and RA on chronic immunosuppression presents for evaluation of progressive shortness of breath with predominant exertional symptoms. Assessment notable for low normal SpO2 at rest, imaging with concern for DPLD and TTE with pEF and PASP elevation. Plan for evaluation as per ILD plan. In addition, will refer to Cardiology for RHC to assess for pulmonary hypertension.   - discussed at length pulmonary hypertension diagnosis with right heart catheterization. Risks of right heart catheterization discussed including insertion site injury (artery, vein and nerve), hemoptysis, worsening of TR and arrhythmia.     Sage Hanna MD  Cardiology   [facility] - Cath Lab

## 2024-04-26 NOTE — NURSING
Received to HR 1 via stretcher and attached to monitors. Report received from BARBARA Tony. S/P [facility]. AAO x3. NADN. Resp even and non labored. Denies needs or pain. Dressing to right neck C/D/I. POC discussed with patient. Understanding voiced.

## 2024-04-26 NOTE — DISCHARGE INSTRUCTIONS
No heavy lifting or strenuous activity for 24 hours. No driving for 24 hours. Keep dressing clean and dry for 24 hours. May remove dressing in 24 hours and gently wash puncture site with warm, soapy water and pat dry with a towel. May apply bandaid if desired. No changes were made to your medications today. Thank you for choosing [facility] for your healthcare needs and allowing the Cath Lab to be a part of your healthcare team.

## 2024-05-07 ENCOUNTER — OFFICE VISIT (OUTPATIENT)
Dept: PULMONOLOGY | Facility: CLINIC | Age: 64
End: 2024-05-07
Payer: MEDICARE

## 2024-05-07 VITALS
DIASTOLIC BLOOD PRESSURE: 72 MMHG | WEIGHT: 199.06 LBS | RESPIRATION RATE: 18 BRPM | SYSTOLIC BLOOD PRESSURE: 130 MMHG | OXYGEN SATURATION: 93 % | HEIGHT: 72 IN | BODY MASS INDEX: 26.96 KG/M2 | HEART RATE: 115 BPM

## 2024-05-07 DIAGNOSIS — J84.9 ILD (INTERSTITIAL LUNG DISEASE): ICD-10-CM

## 2024-05-07 DIAGNOSIS — J96.11 CHRONIC RESPIRATORY FAILURE WITH HYPOXIA: ICD-10-CM

## 2024-05-07 DIAGNOSIS — I27.21 PULMONARY ARTERIAL HYPERTENSION: Primary | ICD-10-CM

## 2024-05-07 DIAGNOSIS — R07.9 CHEST PAIN, UNSPECIFIED TYPE: ICD-10-CM

## 2024-05-07 PROCEDURE — 99215 OFFICE O/P EST HI 40 MIN: CPT | Mod: PBBFAC | Performed by: STUDENT IN AN ORGANIZED HEALTH CARE EDUCATION/TRAINING PROGRAM

## 2024-05-07 PROCEDURE — 1160F RVW MEDS BY RX/DR IN RCRD: CPT | Mod: CPTII,,, | Performed by: STUDENT IN AN ORGANIZED HEALTH CARE EDUCATION/TRAINING PROGRAM

## 2024-05-07 PROCEDURE — 3008F BODY MASS INDEX DOCD: CPT | Mod: CPTII,,, | Performed by: STUDENT IN AN ORGANIZED HEALTH CARE EDUCATION/TRAINING PROGRAM

## 2024-05-07 PROCEDURE — 3078F DIAST BP <80 MM HG: CPT | Mod: CPTII,,, | Performed by: STUDENT IN AN ORGANIZED HEALTH CARE EDUCATION/TRAINING PROGRAM

## 2024-05-07 PROCEDURE — 3075F SYST BP GE 130 - 139MM HG: CPT | Mod: CPTII,,, | Performed by: STUDENT IN AN ORGANIZED HEALTH CARE EDUCATION/TRAINING PROGRAM

## 2024-05-07 PROCEDURE — 1159F MED LIST DOCD IN RCRD: CPT | Mod: CPTII,,, | Performed by: STUDENT IN AN ORGANIZED HEALTH CARE EDUCATION/TRAINING PROGRAM

## 2024-05-07 PROCEDURE — 99215 OFFICE O/P EST HI 40 MIN: CPT | Mod: S$PBB,,, | Performed by: STUDENT IN AN ORGANIZED HEALTH CARE EDUCATION/TRAINING PROGRAM

## 2024-05-07 PROCEDURE — 4010F ACE/ARB THERAPY RXD/TAKEN: CPT | Mod: CPTII,,, | Performed by: STUDENT IN AN ORGANIZED HEALTH CARE EDUCATION/TRAINING PROGRAM

## 2024-05-07 RX ORDER — SILDENAFIL CITRATE 20 MG/1
20 TABLET ORAL 3 TIMES DAILY
Qty: 90 TABLET | Refills: 11 | Status: SHIPPED | OUTPATIENT
Start: 2024-05-07 | End: 2025-05-07

## 2024-05-07 RX ORDER — AMBRISENTAN 5 MG/1
5 TABLET, FILM COATED ORAL DAILY
Qty: 30 TABLET | Refills: 11 | Status: SHIPPED | OUTPATIENT
Start: 2024-05-07 | End: 2024-06-07

## 2024-05-07 RX ORDER — FUROSEMIDE 40 MG/1
40 TABLET ORAL DAILY
Qty: 30 TABLET | Refills: 11 | Status: SHIPPED | OUTPATIENT
Start: 2024-05-07 | End: 2024-05-21

## 2024-05-07 NOTE — ASSESSMENT & PLAN NOTE
CXR with progressive interstitial reticular opacification and now with CT Chest 02/2024 with DPLD characterized by honeycombing, traction bronchiectasis and reticulations. Imaging is not convincing for solely alveolar enlargement process in this patient with nicotine dependence. Now with PFT 03/2024 with severe diffusion deficit and exertional hypoxemia on 6MWT. Degree of respiratory symptoms and diffusion defect out of proportion with parenchymal lung disease now s/p RHC. Differential does include CPFE vs IPF vs UIP in this patient with RA.   - HRCT and PFT 6-12 months after initiation of PAH therapy for new baseline, order on f/u

## 2024-05-07 NOTE — PROGRESS NOTES
[facility]  Pulmonology  ESTABLISHED VISIT     Patient Name:  Shamar Mendez  Primary Care Provider: Harish Puga MD  Date of Service: 05/07/2024    Chief Complaint: Shortness of breath    SUBJECTIVE   HPI:  Shamar Mendez is a 63 y.o. male with RA, chronic immunosuppression (on MTX, Humira and prednisone) and nicotine dependence who presents to follow up on shortness of breath. Last seen 03/2024 with plan for RHC referral.     Andrea reports having shortness of breath that is present with exertion. He is intermittently using his oxygen, primarily within his home. Overall, notes ability to do more within his home with oxygen use. He has no cough. He presents today without his oxygen. No ED presentations since admission. We reviewed results of RHC.     Initial HPI  Andrea reports having progression in his shortness of breath more significant in the past 3 months.  He describes this as shortness of breath that is present upon waking up, improves with rest and is worse with exertion.  He now has shortness of breath with walking over to his mailbox.  He has a cough that is infrequent and productive of mainly clear saliva.  He has a feeling of congestion that feels like it is stuck at the back of his throat.  Has no hospitalizations for any respiratory symptoms he is aware of.  We discussed the findings of his imaging as well as planned workup as outlined and plan below.     Past Medical History:   Diagnosis Date    Arthritis     BPH (benign prostatic hyperplasia)     Carpal tunnel syndrome, bilateral     Hidradenitis suppurativa     History of knee replacement     Hypertension     Opioid dependence     Rheumatoid arthritis        Past Surgical History:   Procedure Laterality Date    CARPAL TUNNEL RELEASE Bilateral     KNEE ARTHROSCOPY Left     NECK SURGERY      RIGHT HEART CATHETERIZATION Right 4/26/2024    Procedure: INSERTION, CATHETER, RIGHT HEART;  Surgeon: Sage Hanna MD;  [facility] C[facility] LAB;  Service: Cardiology;  Laterality: Right;    TONSILLECTOMY      TOTAL KNEE ARTHROPLASTY Left        Family History   Problem Relation Name Age of Onset    Heart disease Mother      Hypertension Mother      Diabetes Father      Heart disease Father      Diabetes Maternal Grandmother      Diabetes Maternal Grandfather      Diabetes Paternal Grandfather      Melanoma Neg Hx          Social History     Socioeconomic History    Marital status:    Tobacco Use    Smoking status: Every Day     Current packs/day: 1.00     Average packs/day: 1 pack/day for 49.3 years (49.3 ttl pk-yrs)     Types: Cigarettes     Start date: 1975     Passive exposure: Current    Smokeless tobacco: Former   Substance and Sexual Activity    Alcohol use: Never       Social History     Social History Narrative    Not on file       Review of patient's allergies indicates:   Allergen Reactions    Wellbutrin [bupropion hcl] Other (See Comments)     Mental status changes        Medications: Medications reviewed to include over the counter medications.    Review of Systems: A focused ROS was completed and found to be negative except for that mentioned above.      OBJECTIVE   PHYSICAL EXAM:  Vitals:    05/07/24 1101   BP: 130/72   BP Location: Left arm   Patient Position: Sitting   BP Method: Large (Automatic)   Pulse: (!) 115   Resp: 18   SpO2: (!) 93%   Weight: 90.3 kg (199 lb 1.2 oz)   Height: 6' (1.829 m)        GENERAL: NAD  RESPIRATORY: clear to auscultation, no wheezing, rales or rhonchi  CARDIOVASCULAR: Regular rate and rhythm, no murmurs rubs or gallops.  SKIN: no rash, jaundice, ecchymosis or ulcers  MUSCULOSKELETAL: No clubbing or cyanosis; no pedal edema, ulnar deviation in bilateral hands more prominent in L hand  NEUROLOGIC: AO ×3, no gross deficits  PSYCH: Normal mood and affect    LABS:  Lab studies reviewed and notable for serum creatinine 1 -1.5, CO2 24-27, H/H 15.1/45.9, Plts 221    IMAGING:  Imaging reviewed and  notable for CT Chest 02/2024 bilateral upper lobe emphysema (centrilobular, paraseptal and panacinar), reticulation of the lung parenchyma in bilateral peripheral lung fields with mid and lower lung predominance, honeycombing and associated traction bronchiectasis present; official report reviewed as well. Serial CXR reviewed from 05/2020, 10/2014, 07/2021 and 09/2021 with progression in interstitial pulmonary densities     TTE:  02/2024:  LVEF 60-65%, normal diastolic function, RV size normal, TAPSE 2.02, normal LA size, normal RA size, mild MR, mild TR, PASP 44    RHC 04/2024:  mPAP 25, PCWP 5, PVR 3.1 (Russel), CO/CI 6.5/3.1 (Russel)    LUNG FUNCTION TESTING:   SPIROMETRY/PFT:  03/2024 Pre Post   FVC 4.29/-0.70 4.13/-0.92   FEV1 2.71/-1.63 2.72/-1.62   FEV1/FVC 63/-1.71 66/-1.34   TLC 7.26/-0.16    FRC  4.60/0.76    RV 2.97/1.15    DLCO 7.38/-6.22 (26%)    The diffusion capacity met repeatability criteria. There is a severe diffusion defect.     6MWD:  Date Distance (ft) Resting SpO2; Renato SpO2 O2 Required   03/2024 1104 (336.5m) 91%, RA; 84% 3.5L           ASSESSMENT & PLAN     1. Pulmonary arterial hypertension  Assessment & Plan:  62 yo M with progressive dyspnea found to have severe diffusion defect with no lung restriction concerning for pulmonary vascular disease now s/p RHC consistent with PAH.  RHC at diagnosis 04/2024 mPAP 25, PCWP 5, PVR 3.1 (Russel), CO/CI 6.5/3.1 (Russel)  - NYHA Class II/III  - WHO group 1; CTD-PAH with RA hx, overlapping CTD-ILD  - REVEAL risk score 10, high risk warranting dual therapy at intiation  - cont diuresis with Lasix IV at increased dose 40 mg Qday  - start Sildenafil 20 mg TID and Ambrisentan 5 mg QDay   -- prior hepatic dysfunction 2/2 Rx noted, will obtain baseline and serial HFP   -- increase ERA dose with toleration to goal 10 mg QDay  - medication side effects addressed  - obtain NTproBNP and HFP baseline  - serial re-evaluate in 3-6 months with 6MWT, TTE, NTproBNP, CMP  -  Vaccines up to date; influenza, pneumococcal, Tdap  - Pulmonary Rehab referral upon initiation of therapy  - O2 supplementation required, goal SpO2 >92%  - complete w/u with V/Q scan  - given DPLD, future considerations include inhaled treprostinil  - if persistent high risk 6 months following initiation of therapy, plan for referral to PH/transplant center       Orders:  -     furosemide (LASIX) 40 MG tablet; Take 1 tablet (40 mg total) by mouth once daily.  Dispense: 30 tablet; Refill: 11  -     sildenafil (REVATIO) 20 mg Tab; Take 1 tablet (20 mg total) by mouth 3 (three) times daily.  Dispense: 90 tablet; Refill: 11  -     NM Lung Ventilation Perfusion Imaging; Future; Expected date: 05/07/2024  -     X-Ray Chest PA And Lateral; Future; Expected date: 05/07/2024  -     NT-Pro Natriuretic Peptide; Future; Expected date: 05/07/2024  -     Hepatic Function Panel; Future; Expected date: 05/07/2024  -     Echo Saline Bubble? No; Future; Expected date: 11/07/2024  -     ambrisentan (LETAIRIS) 5 MG Tab; Take 1 tablet (5 mg total) by mouth once daily.  Dispense: 30 tablet; Refill: 11    2. Chest pain, unspecified type    3. ILD (interstitial lung disease)  Assessment & Plan:  CXR with progressive interstitial reticular opacification and now with CT Chest 02/2024 with DPLD characterized by honeycombing, traction bronchiectasis and reticulations. Imaging is not convincing for solely alveolar enlargement process in this patient with nicotine dependence. Now with PFT 03/2024 with severe diffusion deficit and exertional hypoxemia on 6MWT. Degree of respiratory symptoms and diffusion defect out of proportion with parenchymal lung disease now s/p RHC. Differential does include CPFE vs IPF vs UIP in this patient with RA.   - HRCT and PFT 6-12 months after initiation of PAH therapy for new baseline, order on f/u       4. Chronic respiratory failure with hypoxia  Assessment & Plan:  6MWT with exertional hypoxia in this patient  with PAH and DPLD. Cont NC supplementation with 3-4L with exertion. Improved exercise capacity with NC.  - educated on importance of NC therapy with new diagnosis of PH, repeat 6MWT per PAH plan            Follow up in about 3 months (around 8/7/2024).      Case was discussed with patient; all questions were answered to patient's satisfaction and patient verbalized understanding.     Addie Hernandez MD  Pulmonary Medicine  [facility]  Phone: [phone]

## 2024-05-07 NOTE — ASSESSMENT & PLAN NOTE
6MWT with exertional hypoxia in this patient with PAH and DPLD. Cont NC supplementation with 3-4L with exertion. Improved exercise capacity with NC.  - educated on importance of NC therapy with new diagnosis of PH, repeat 6MWT per PAH plan

## 2024-05-07 NOTE — ASSESSMENT & PLAN NOTE
64 yo M with progressive dyspnea found to have severe diffusion defect with no lung restriction concerning for pulmonary vascular disease now s/p RHC consistent with PAH.  RHC at diagnosis 04/2024 mPAP 25, PCWP 5, PVR 3.1 (Russel), CO/CI 6.5/3.1 (Russel)  - NYHA Class II/III  - WHO group 1; CTD-PAH with RA hx, overlapping CTD-ILD  - REVEAL risk score 10, high risk warranting dual therapy at intiation  - cont diuresis with Lasix IV at increased dose 40 mg Qday  - start Sildenafil 20 mg TID and Ambrisentan 5 mg QDay   -- prior hepatic dysfunction 2/2 Rx noted, will obtain baseline and serial HFP   -- increase ERA dose with toleration to goal 10 mg QDay  - medication side effects addressed  - obtain NTproBNP and HFP baseline  - serial re-evaluate in 3-6 months with 6MWT, TTE, NTproBNP, CMP  - Vaccines up to date; influenza, pneumococcal, Tdap  - Pulmonary Rehab referral upon initiation of therapy  - O2 supplementation required, goal SpO2 >92%  - complete w/u with V/Q scan  - given DPLD, future considerations include inhaled treprostinil  - if persistent high risk 6 months following initiation of therapy, plan for referral to PH/transplant center

## 2024-05-09 DIAGNOSIS — Z71.89 COMPLEX CARE COORDINATION: ICD-10-CM

## 2024-05-15 ENCOUNTER — TELEPHONE (OUTPATIENT)
Dept: PULMONOLOGY | Facility: CLINIC | Age: 64
End: 2024-05-15
Payer: MEDICARE

## 2024-05-15 DIAGNOSIS — I27.21 PULMONARY ARTERIAL HYPERTENSION: Primary | ICD-10-CM

## 2024-05-15 NOTE — TELEPHONE ENCOUNTER
Spoke to the patient verbalized PA came thru the fax this morning. Awaiting  approval. Patient also stated insurance will only cover generic brand of lasix. Message sent to . Patient understood. No other questions at this time.

## 2024-05-15 NOTE — TELEPHONE ENCOUNTER
----- Message from Xenia Moore sent at 5/15/2024  2:49 PM CDT -----  Patient called needing to speak to a nurse about two prescriptions that insurance won't cover. A good phone number to reach him is [phone]

## 2024-05-17 LAB
OHS QRS DURATION: 88 MS
OHS QTC CALCULATION: 469 MS

## 2024-05-21 RX ORDER — FUROSEMIDE 20 MG/1
40 TABLET ORAL DAILY
Qty: 60 TABLET | Refills: 11 | Status: SHIPPED | OUTPATIENT
Start: 2024-05-21 | End: 2025-05-21

## 2024-05-21 NOTE — TELEPHONE ENCOUNTER
Generic furosemide 40 mg prescription provided.  Awaiting on prior authorization on Revatio (previously failed) and ambrisentan.

## 2024-06-07 ENCOUNTER — TELEPHONE (OUTPATIENT)
Dept: PHARMACY | Facility: CLINIC | Age: 64
End: 2024-06-07

## 2024-06-07 DIAGNOSIS — I27.21 PULMONARY ARTERIAL HYPERTENSION: Primary | ICD-10-CM

## 2024-06-07 NOTE — LETTER
June 14, 2024    Shamar Mendez  [facility] [place] [address]             [facility]  [address]  [facility] [address]  Fax: [phone] Dear Mr. Shamar Mendez     It was a pleasure speaking with you. To follow up on our conversation on 6/14/2024, the Pharmacy Patient Assistance Program needs more information from you before we can submit your Revatio and Macitentan application to the PAN Program. Please return the following documents to the Pharmacy Patient Assistance office ASAP.  Fax requested documentation to [phone] or email [email or web address]. Documentation can also be mailed to address listed below       Proof of household Income( such as social security statement, 1099 form, pension statement or 3 consecutive pay stubs  Copy of all Insurance cards( front and back)  Signed and dated HIPAA /Patient Information Forms              Whats Next:     Once I receive your documentation and authorization from your Provider, your application will be submitted to the Respected Assistance Program for review. Please be advised it will take 2 to 4 weeks for your application to be processed so you may have to purchase a month's supply of medication from your pharmacy to hold you over during the waiting period. You will be notified of approval or denial by The Program(mail) or myself.      If you have any questions or concerns, please give me a call         Sincerely   Pharmacy Patient Assistance  [address] [facility] [id number]  [place] [address]  Phone: [phone]  Fax: [phone]  Email: [email or web address]

## 2024-06-07 NOTE — TELEPHONE ENCOUNTER
We have reached out to Shamar Mendez to inform him of the PAN application process for Revatio and Opsumit and whats required to apply.  Shamar Mendez did not answer. We have left a voicemail and mailed a letter.   Follow-up will be made in 5 business days.

## 2024-06-14 ENCOUNTER — TELEPHONE (OUTPATIENT)
Dept: PHARMACY | Facility: CLINIC | Age: 64
End: 2024-06-14

## 2024-06-14 NOTE — TELEPHONE ENCOUNTER
A 2nd attempt has been made to establish contact with Shamar  via voicemail left . The final contact attempt will be made in 5 business days

## 2024-06-17 PROBLEM — J96.11 CHRONIC RESPIRATORY FAILURE WITH HYPOXIA: Status: RESOLVED | Noted: 2024-03-14 | Resolved: 2024-06-17

## 2024-06-21 ENCOUNTER — HOSPITAL ENCOUNTER (OUTPATIENT)
Dept: RADIOLOGY | Facility: HOSPITAL | Age: 64
Discharge: HOME | End: 2024-06-21
Attending: STUDENT IN AN ORGANIZED HEALTH CARE EDUCATION/TRAINING PROGRAM
Payer: MEDICARE

## 2024-06-21 DIAGNOSIS — I27.21 PULMONARY ARTERIAL HYPERTENSION: ICD-10-CM

## 2024-06-21 PROCEDURE — 71046 X-RAY EXAM CHEST 2 VIEWS: CPT | Mod: TC,PN

## 2024-07-03 ENCOUNTER — TELEPHONE (OUTPATIENT)
Dept: PULMONOLOGY | Facility: CLINIC | Age: 64
End: 2024-07-03
Payer: MEDICARE

## 2024-07-03 NOTE — TELEPHONE ENCOUNTER
Spoke with [facility] & they directed me to the correct department to start an appeal on the Opsumit denial from 06/07/2024.  was the agent I spoke with an he help me initiate an expedited appeal , can take up to 72hrs.     Phone:[phone] Fax:[phone]      Reference ID:[id number]    Voiced understanding and provided all information requested.

## 2024-07-03 NOTE — TELEPHONE ENCOUNTER
----- Message from Brendon Boston LPN sent at 7/2/2024  4:22 PM CDT -----    ----- Message -----  From: Addie Hernandez MD  Sent: 7/2/2024   2:12 PM CDT  To: Brendon Boston LPN; #    Ok, when you get a response like this, please immediately request a peer to peer. If you see my last note, he has a Pulmonary Hypertension diagnosis. Just means the records were not reviewed by the insurance team.   Please request and schedule a peer to peer ASAP. Thanks.  ----- Message -----  From: Brendon Boston LPN  Sent: 7/1/2024   3:07 PM CDT  To: Addie Hernandez MD    Revatio was approved & Opsumit Tab 10mg is denied for not meeting the prior authorization requirement(s). Medication  authorization requires the following:  (1) One of the following:  (A) Diagnosis of pulmonary arterial hypertension confirmed by right heart catheterization.  (B) You are currently on any therapy for the diagnosis of pulmonary arterial hypertension.  ----- Message -----  From: Addie Hernandez MD  Sent: 7/1/2024   7:57 AM CDT  To: Brendon Boston LPN    Can I get an update on this. Peer to peer requested or was paperwork sent for PA?  ----- Message -----  From: Brendon Boston LPN  Sent: 5/9/2024   3:43 PM CDT  To: Addie Hernandez MD    Spoke to the patient voiced both rx's were denied. Revatio & Letairis. Patient want to know will you send in something else?  ----- Message -----  From: Xenia Moore  Sent: 5/9/2024   1:36 PM CDT  To: Mary Real Staff    Patient called needing to speak to a nurse about some concerns he has on a prescription. He needs to speak to someone about what's going on, and a good phone number is [phone]

## 2024-07-09 ENCOUNTER — TELEPHONE (OUTPATIENT)
Dept: PULMONOLOGY | Facility: CLINIC | Age: 64
End: 2024-07-09
Payer: MEDICARE

## 2024-07-09 NOTE — TELEPHONE ENCOUNTER
----- Message from Addie Hernandez MD sent at 7/9/2024  9:29 AM CDT -----  Updates?  ----- Message -----  From: Brendon Boston LPN  Sent: 7/1/2024   3:07 PM CDT  To: Addie Hernandez MD    Revatio was approved & Opsumit Tab 10mg is denied for not meeting the prior authorization requirement(s). Medication  authorization requires the following:  (1) One of the following:  (A) Diagnosis of pulmonary arterial hypertension confirmed by right heart catheterization.  (B) You are currently on any therapy for the diagnosis of pulmonary arterial hypertension.  ----- Message -----  From: Addie Hernandez MD  Sent: 7/1/2024   7:57 AM CDT  To: Brendon Boston LPN    Can I get an update on this. Peer to peer requested or was paperwork sent for PA?  ----- Message -----  From: Brendon Boston LPN  Sent: 5/9/2024   3:43 PM CDT  To: Addie Hernandez MD    Spoke to the patient voiced both rx's were denied. Revatio & Letairis. Patient want to know will you send in something else?  ----- Message -----  From: Xenia Moore  Sent: 5/9/2024   1:36 PM CDT  To: Mary Real Staff    Patient called needing to speak to a nurse about some concerns he has on a prescription. He needs to speak to someone about what's going on, and a good phone number is [phone]

## 2024-07-22 ENCOUNTER — TELEPHONE (OUTPATIENT)
Dept: PULMONOLOGY | Facility: CLINIC | Age: 64
End: 2024-07-22
Payer: MEDICARE

## 2024-07-22 DIAGNOSIS — I27.21 PULMONARY ARTERIAL HYPERTENSION: ICD-10-CM

## 2024-07-22 RX ORDER — SILDENAFIL CITRATE 20 MG/1
20 TABLET ORAL 3 TIMES DAILY
Qty: 90 TABLET | Refills: 11 | Status: CANCELLED | OUTPATIENT
Start: 2024-07-22 | End: 2025-07-22

## 2024-07-22 NOTE — TELEPHONE ENCOUNTER
Medication PA was approved for both med's but he needed it sent to a different pharmacy to get those filled and began therapy. Informed him I would get this to  for verification again and send it to [facility] as requested and he voiced understanding.

## 2024-07-22 NOTE — TELEPHONE ENCOUNTER
----- Message from Melanie Wells sent at 7/22/2024  1:43 PM CDT -----  Patient need for you to call him it about his medicine call back number [phone]

## 2024-07-25 ENCOUNTER — TELEPHONE (OUTPATIENT)
Dept: PULMONOLOGY | Facility: CLINIC | Age: 64
End: 2024-07-25
Payer: MEDICARE

## 2024-07-25 NOTE — TELEPHONE ENCOUNTER
Patient states he recently went to  his medicine,Macitentan, but he was not able to pick it up. He is requesting for it be sent to [facility].

## 2024-07-25 NOTE — TELEPHONE ENCOUNTER
----- Message from Diana Infante sent at 7/25/2024 11:33 AM CDT -----  Regarding: speak with nurse  Who Called: Shamar Mendez    Caller is requesting assistance/information from provider's office.    Symptoms (please be specific): Patient need to speak to nurse about his medicine, he said he would rather  explain to you.   How long has patient had these symptoms:    List of preferred pharmacies on file (remove unneeded): [unfilled]  If different, enter pharmacy into here including location and phone number:       Preferred Method of Contact: Phone Call  Patient's Preferred Phone Number on File: [phone]   Best Call Back Number, if different:  Additional Information:

## 2024-07-30 ENCOUNTER — TELEPHONE (OUTPATIENT)
Dept: PULMONOLOGY | Facility: CLINIC | Age: 64
End: 2024-07-30
Payer: MEDICARE

## 2024-07-30 ENCOUNTER — HOSPITAL ENCOUNTER (OUTPATIENT)
Dept: RADIOLOGY | Facility: HOSPITAL | Age: 64
Discharge: HOME | End: 2024-07-30
Attending: STUDENT IN AN ORGANIZED HEALTH CARE EDUCATION/TRAINING PROGRAM
Payer: MEDICARE

## 2024-07-30 DIAGNOSIS — I27.21 PULMONARY ARTERIAL HYPERTENSION: ICD-10-CM

## 2024-07-30 DIAGNOSIS — I27.21 PULMONARY ARTERIAL HYPERTENSION: Primary | ICD-10-CM

## 2024-07-30 PROCEDURE — A9567 TECHNETIUM TC-99M AEROSOL: HCPCS | Performed by: STUDENT IN AN ORGANIZED HEALTH CARE EDUCATION/TRAINING PROGRAM

## 2024-07-30 PROCEDURE — A9540 TC99M MAA: HCPCS | Performed by: STUDENT IN AN ORGANIZED HEALTH CARE EDUCATION/TRAINING PROGRAM

## 2024-07-30 PROCEDURE — 71046 X-RAY EXAM CHEST 2 VIEWS: CPT | Mod: 26,,, | Performed by: RADIOLOGY

## 2024-07-30 PROCEDURE — 78582 LUNG VENTILAT&PERFUS IMAGING: CPT | Mod: TC

## 2024-07-30 PROCEDURE — 71046 X-RAY EXAM CHEST 2 VIEWS: CPT | Mod: TC

## 2024-07-30 PROCEDURE — 78582 LUNG VENTILAT&PERFUS IMAGING: CPT | Mod: 26,,, | Performed by: RADIOLOGY

## 2024-07-30 RX ADMIN — KIT FOR THE PREPARATION OF TECHNETIUM TC 99M ALBUMIN AGGREGATED 3.5 MILLICURIE: 2 INJECTION, POWDER, LYOPHILIZED, FOR SUSPENSION INTRAPERITONEAL; INTRAVENOUS at 12:07

## 2024-07-30 RX ADMIN — KIT FOR THE PREPARATION OF TECHNETIUM TC 99M PENTETATE 41 MILLICURIE: 20 INJECTION, POWDER, LYOPHILIZED, FOR SOLUTION INTRAVENOUS; RESPIRATORY (INHALATION) at 12:07

## 2024-08-06 DIAGNOSIS — I10 PRIMARY HYPERTENSION: Chronic | ICD-10-CM

## 2024-08-07 ENCOUNTER — OFFICE VISIT (OUTPATIENT)
Dept: PULMONOLOGY | Facility: CLINIC | Age: 64
End: 2024-08-07
Payer: MEDICARE

## 2024-08-07 VITALS
HEART RATE: 89 BPM | WEIGHT: 199.06 LBS | OXYGEN SATURATION: 94 % | BODY MASS INDEX: 26.96 KG/M2 | RESPIRATION RATE: 16 BRPM | HEIGHT: 72 IN | SYSTOLIC BLOOD PRESSURE: 142 MMHG | DIASTOLIC BLOOD PRESSURE: 69 MMHG

## 2024-08-07 DIAGNOSIS — I27.21 PULMONARY ARTERIAL HYPERTENSION: Primary | ICD-10-CM

## 2024-08-07 DIAGNOSIS — J84.9 ILD (INTERSTITIAL LUNG DISEASE): ICD-10-CM

## 2024-08-07 DIAGNOSIS — J96.11 CHRONIC RESPIRATORY FAILURE WITH HYPOXIA: ICD-10-CM

## 2024-08-07 PROCEDURE — 99999 PR PBB SHADOW E&M-EST. PATIENT-LVL V: CPT | Mod: PBBFAC,,, | Performed by: STUDENT IN AN ORGANIZED HEALTH CARE EDUCATION/TRAINING PROGRAM

## 2024-08-07 PROCEDURE — 1159F MED LIST DOCD IN RCRD: CPT | Mod: CPTII,,, | Performed by: STUDENT IN AN ORGANIZED HEALTH CARE EDUCATION/TRAINING PROGRAM

## 2024-08-07 PROCEDURE — 99215 OFFICE O/P EST HI 40 MIN: CPT | Mod: PBBFAC | Performed by: STUDENT IN AN ORGANIZED HEALTH CARE EDUCATION/TRAINING PROGRAM

## 2024-08-07 PROCEDURE — 3008F BODY MASS INDEX DOCD: CPT | Mod: CPTII,,, | Performed by: STUDENT IN AN ORGANIZED HEALTH CARE EDUCATION/TRAINING PROGRAM

## 2024-08-07 PROCEDURE — 3078F DIAST BP <80 MM HG: CPT | Mod: CPTII,,, | Performed by: STUDENT IN AN ORGANIZED HEALTH CARE EDUCATION/TRAINING PROGRAM

## 2024-08-07 PROCEDURE — 99215 OFFICE O/P EST HI 40 MIN: CPT | Mod: S$PBB,,, | Performed by: STUDENT IN AN ORGANIZED HEALTH CARE EDUCATION/TRAINING PROGRAM

## 2024-08-07 PROCEDURE — 4010F ACE/ARB THERAPY RXD/TAKEN: CPT | Mod: CPTII,,, | Performed by: STUDENT IN AN ORGANIZED HEALTH CARE EDUCATION/TRAINING PROGRAM

## 2024-08-07 PROCEDURE — 3077F SYST BP >= 140 MM HG: CPT | Mod: CPTII,,, | Performed by: STUDENT IN AN ORGANIZED HEALTH CARE EDUCATION/TRAINING PROGRAM

## 2024-08-07 PROCEDURE — 1160F RVW MEDS BY RX/DR IN RCRD: CPT | Mod: CPTII,,, | Performed by: STUDENT IN AN ORGANIZED HEALTH CARE EDUCATION/TRAINING PROGRAM

## 2024-08-07 RX ORDER — AMLODIPINE AND BENAZEPRIL HYDROCHLORIDE 10; 40 MG/1; MG/1
1 CAPSULE ORAL DAILY
Qty: 90 CAPSULE | Refills: 0 | Status: SHIPPED | OUTPATIENT
Start: 2024-08-07

## 2024-08-07 RX ORDER — METHOTREXATE 2.5 MG/1
15 TABLET ORAL
COMMUNITY
Start: 2024-08-06 | End: 2024-11-04

## 2024-08-07 RX ORDER — PILOCARPINE HYDROCHLORIDE 5 MG/1
5 TABLET, FILM COATED ORAL
COMMUNITY
Start: 2024-08-06 | End: 2025-08-06

## 2024-08-14 ENCOUNTER — TELEPHONE (OUTPATIENT)
Dept: PULMONOLOGY | Facility: CLINIC | Age: 64
End: 2024-08-14
Payer: MEDICARE

## 2024-08-14 NOTE — TELEPHONE ENCOUNTER
Patient states he needs macitentan sent to [facility]. I assured him that we would send it right away due to it being confused with a different pharmacy.

## 2024-08-14 NOTE — TELEPHONE ENCOUNTER
----- Message from Melanie Wells sent at 8/14/2024  1:49 PM CDT -----  PATIENT NEED FOR YOU TO CALL [phone]

## 2024-11-14 ENCOUNTER — HOSPITAL ENCOUNTER (OUTPATIENT)
Dept: CARDIOLOGY | Facility: HOSPITAL | Age: 64
Discharge: HOME | End: 2024-11-14
Attending: STUDENT IN AN ORGANIZED HEALTH CARE EDUCATION/TRAINING PROGRAM
Payer: MEDICARE

## 2024-11-14 DIAGNOSIS — I27.21 PULMONARY ARTERIAL HYPERTENSION: ICD-10-CM

## 2024-11-14 LAB
AORTIC ROOT ANNULUS: 3.46 CM
AORTIC VALVE CUSP SEPERATION: 2.48 CM
APICAL FOUR CHAMBER EJECTION FRACTION: 42 %
AV INDEX (PROSTH): 1.11
AV MEAN GRADIENT: 3.2 MMHG
AV PEAK GRADIENT: 5.8 MMHG
AV VALVE AREA BY VELOCITY RATIO: 3.8 CM²
AV VALVE AREA: 4.6 CM²
AV VELOCITY RATIO: 0.92
CV ECHO LV RWT: 0.36 CM
DOP CALC AO PEAK VEL: 1.2 M/S
DOP CALC AO VTI: 21.3 CM
DOP CALC LVOT AREA: 4.2 CM2
DOP CALC LVOT DIAMETER: 2.3 CM
DOP CALC LVOT PEAK VEL: 1.1 M/S
DOP CALC LVOT STROKE VOLUME: 98.4 CM3
DOP CALCLVOT PEAK VEL VTI: 23.7 CM
E WAVE DECELERATION TIME: 95.57 MSEC
E/A RATIO: 0.71
E/E' RATIO: 4.06 M/S
ECHO LV POSTERIOR WALL: 1.1 CM (ref 0.6–1.1)
EJECTION FRACTION: 45 %
FRACTIONAL SHORTENING: 34.4 % (ref 28–44)
INTERVENTRICULAR SEPTUM: 1.1 CM (ref 0.6–1.1)
IVC DIAMETER: 1.12 CM
LEFT ATRIUM AREA SYSTOLIC (APICAL 4 CHAMBER): 25.06 CM2
LEFT ATRIUM SIZE: 4.17 CM
LEFT INTERNAL DIMENSION IN SYSTOLE: 4 CM (ref 2.1–4)
LEFT VENTRICLE DIASTOLIC VOLUME: 188.63 ML
LEFT VENTRICLE END DIASTOLIC VOLUME APICAL 4 CHAMBER: 111.14 ML
LEFT VENTRICLE END SYSTOLIC VOLUME APICAL 4 CHAMBER: 81.94 ML
LEFT VENTRICLE SYSTOLIC VOLUME: 69.83 ML
LEFT VENTRICULAR INTERNAL DIMENSION IN DIASTOLE: 6.1 CM (ref 3.5–6)
LEFT VENTRICULAR MASS: 287.5 G
LV LATERAL E/E' RATIO: 4.6 M/S
LV SEPTAL E/E' RATIO: 3.63 M/S
LVED V (TEICH): 188.63 ML
LVES V (TEICH): 69.83 ML
LVOT MG: 2.98 MMHG
LVOT MV: 0.82 CM/S
MV PEAK A VEL: 0.97 M/S
MV PEAK E VEL: 0.69 M/S
MV STENOSIS PRESSURE HALF TIME: 27.72 MS
MV VALVE AREA P 1/2 METHOD: 7.94 CM2
OHS CV RV/LV RATIO: 0.72 CM
PV PEAK GRADIENT: 9 MMHG
PV PEAK VELOCITY: 1.47 M/S
RA MAJOR: 3.86 CM
RA PRESSURE ESTIMATED: 3 MMHG
RIGHT VENTRICLE DIASTOLIC BASEL DIMENSION: 4.4 CM
RIGHT VENTRICLE DIASTOLIC LENGTH: 6.9 CM
RIGHT VENTRICLE DIASTOLIC MID DIMENSION: 3.2 CM
RIGHT VENTRICULAR LENGTH IN DIASTOLE (APICAL 4-CHAMBER VIEW): 6.87 CM
RV MID DIAMA: 3.22 CM
TDI LATERAL: 0.15 M/S
TDI SEPTAL: 0.19 M/S
TDI: 0.17 M/S
TRICUSPID ANNULAR PLANE SYSTOLIC EXCURSION: 2.09 CM

## 2024-11-14 PROCEDURE — 93306 TTE W/DOPPLER COMPLETE: CPT

## 2024-12-13 RX ORDER — AMBRISENTAN 5 MG/1
5 TABLET, FILM COATED ORAL DAILY
COMMUNITY
Start: 2024-10-30

## 2024-12-13 RX ORDER — METHOTREXATE 2.5 MG/1
15 TABLET ORAL
COMMUNITY
Start: 2024-11-07 | End: 2025-05-06

## 2024-12-15 NOTE — PROGRESS NOTES
[facility]  Pulmonology  ESTABLISHED VISIT     Patient Name:  Shamar Mendez  Primary Care Provider: Harish Puga MD  Date of Service: 12/16/2024    Chief Complaint: Shortness of breath    SUBJECTIVE   HPI:  Shamar Mendez is a 64 y.o. male with pulmonary arterial hypertension (Dx 04/2024: mPAP 25, PCWP 5, PVR 3.1, Russel) , RA, chronic immunosuppression (on MTX, Humira and prednisone) and nicotine dependence who presents to follow up on shortness of breath. Last seen 08/2024 with pending macitentan initiation and planned TTE.     Andrea states that he has noticed an improvement in his breathing in the past 2 months.  He states that he has more energy to perform activities as home as well as walking.  He is using his oxygen all the time as previously prescribed.  He is taking his sildenafil 3 times a day and macitentan once daily.  He is still having a good diuretic effect and has increased frequency and volume with his output.  We discussed his previous lab test that demonstrated an improvement in his NT pro BNP.  He is planned for CT chest in February and repeat lab work at that time.  He has decreased his smoking to half a pack a day; earlier this year he was at 1 pack daily with peak 2ppd. We reviewed the results of his TTE.    Initial HPI  Andrea reports having progression in his shortness of breath more significant in the past 3 months.  He describes this as shortness of breath that is present upon waking up, improves with rest and is worse with exertion.  He now has shortness of breath with walking over to his mailbox.  He has a cough that is infrequent and productive of mainly clear saliva.  He has a feeling of congestion that feels like it is stuck at the back of his throat.  Has no hospitalizations for any respiratory symptoms he is aware of.  We discussed the findings of his imaging as well as planned workup as outlined and plan below.     08/2024: reports that he has intermittent  shortness of breath.  He wonders whether his breathing will ever get better to be able to perform activities without requiring oxygen.  We discussed his initial symptoms where he had shortness of breath with walking to his mailbox, and since starting oxygen therapy has had an interval increase in how much he can do with the supportive oxygen.  He did stop taking his sildenafil for few weeks because he had concerns that it was making his breathing worse, he did notice that his breathing was worse with stopping the medication.  He has been using his Lasix daily and reports good urine output (40 mg daily).  He was approved for macitentan, however, has not received the medication from his pharmacy.  About 2 months ago he has methotrexate was stopped by another provider, however, he has had worsening of his arthralgias and has had the methotrexate reinstated.  We discussed his new diagnosis of pulmonary arterial hypertension, expected course and importance of medication adherence.      Past Medical History:   Diagnosis Date    Arthritis     BPH (benign prostatic hyperplasia)     Carpal tunnel syndrome, bilateral     Hidradenitis suppurativa     History of knee replacement     Hypertension     Opioid dependence     Rheumatoid arthritis        Past Surgical History:   Procedure Laterality Date    CARPAL TUNNEL RELEASE Bilateral     KNEE ARTHROSCOPY Left     NECK SURGERY      RIGHT HEART CATHETERIZATION Right 4/26/2024    Procedure: INSERTION, CATHETER, RIGHT HEART;  Surgeon: Sage Hanna MD;  Location: [facility] CATH LAB;  Service: Cardiology;  Laterality: Right;    TONSILLECTOMY      TOTAL KNEE ARTHROPLASTY Left        Family History   Problem Relation Name Age of Onset    Heart disease Mother      Hypertension Mother      Diabetes Father      Heart disease Father      Diabetes Maternal Grandmother      Diabetes Maternal Grandfather      Diabetes Paternal Grandfather      Melanoma Neg Hx          Social History      Socioeconomic History    Marital status:    Tobacco Use    Smoking status: Every Day     Current packs/day: 0.50     Average packs/day: 1 pack/day for 50.0 years (49.8 ttl pk-yrs)     Types: Cigarettes     Start date: 1975     Passive exposure: Current    Smokeless tobacco: Former   Substance and Sexual Activity    Alcohol use: Never     Social Drivers of Health     Financial Resource Strain: Medium Risk (11/13/2024)    Overall Financial Resource Strain (CARDIA)     Difficulty of Paying Living Expenses: Somewhat hard   Food Insecurity: Food Insecurity Present (11/13/2024)    Hunger Vital Sign     Worried About Running Out of Food in the Last Year: Never true     Ran Out of Food in the Last Year: Sometimes true   Physical Activity: Unknown (11/13/2024)    Exercise Vital Sign     Days of Exercise per Week: Patient declined   Stress: No Stress Concern Present (11/13/2024)    [employer] [place] [employer] - Occupational Stress Questionnaire     Feeling of Stress : Only a little   Housing Stability: Unknown (11/13/2024)    Housing Stability Vital Sign     Unable to Pay for Housing in the Last Year: No       Social History     Social History Narrative    Not on file       Review of patient's allergies indicates:   Allergen Reactions    Wellbutrin [bupropion hcl] Other (See Comments)     Mental status changes        Medications: Medications reviewed to include over the counter medications.    Review of Systems: A focused ROS was completed and found to be negative except for that mentioned above.      OBJECTIVE   PHYSICAL EXAM:  Vitals:    12/16/24 1328   BP: 134/82   BP Location: Left arm   Patient Position: Sitting   Pulse: (!) 127   SpO2: (!) 92%   Weight: 90.3 kg (199 lb)   Height: 6' (1.829 m)          GENERAL: NAD  RESPIRATORY: mild inspiratory and expiratory bibasilar crackeles, no wheezing, rales or rhonchi  CARDIOVASCULAR: Regular rate and rhythm, no murmurs rubs or gallops.  SKIN: no rash,  jaundice, ecchymosis or ulcers  MUSCULOSKELETAL: No clubbing or cyanosis; no pedal edema, ulnar deviation in bilateral hands more prominent in L hand  NEUROLOGIC: AO ×3, no gross deficits  PSYCH: Normal mood and affect    LABS:  Lab studies reviewed and notable for H/H 15/47 (04/2024), CO2 32, SCr 1.00(08/2024) AST/ALT 21/16 (11/2024)     01/29/24 09:31 05/07/24 12:16 08/07/24 11:56   NT-proBNP 243 (H) 3,701 (H) 2,106 (H)       IMAGING:  Imaging reviewed and notable for CT Chest 02/2024 bilateral upper lobe emphysema (centrilobular, paraseptal and panacinar), reticulation of the lung parenchyma in bilateral peripheral lung fields with mid and lower lung predominance, honeycombing and associated traction bronchiectasis present; official report reviewed as well. Serial CXR reviewed from 05/2020, 10/2014, 07/2021 and 09/2021 with progression in interstitial pulmonary densities     V/Q scan 07/2024:   Impression: Ventilation perfusion defects mostly matched, recommend CT to fully evaluate these areas.  This indicates intermediate probability for pulmonary embolism.    TTE:  02/2024:  LVEF 60-65%, normal diastolic function, RV size normal, TAPSE 2.02, normal LA size, normal RA size, mild MR, mild TR, PASP 44    11/2024: LVEF 50-55%, normal diastolic function, normal RV size, TAPSE 2.09, mild MR, trace TR, IVC 3 mmHg, trivial pericardial effusion    RHC 04/2024:  mPAP 25, PCWP 5, PVR 3.1 (Russel), CO/CI 6.5/3.1 (Russel)    LUNG FUNCTION TESTING:   SPIROMETRY/PFT:  03/2024 Pre Post   FVC 4.29/-0.70 4.13/-0.92   FEV1 2.71/-1.63 2.72/-1.62   FEV1/FVC 63/-1.71 66/-1.34   TLC 7.26/-0.16    FRC  4.60/0.76    RV 2.97/1.15    DLCO 7.38/-6.22 (26%)    The diffusion capacity met repeatability criteria. There is a severe diffusion defect.     6MWD:  Date Distance (ft) Resting SpO2; Renato SpO2 O2 Required   03/2024 1104 (336.5m) 91%, RA; 84% 3.5L           ASSESSMENT & PLAN     1. Pulmonary arterial hypertension  Assessment & Plan:  63  yo M with progressive dyspnea found to have severe diffusion defect with no lung restriction concerning for pulmonary vascular disease now s/p RHC consistent with PAH 2/2 CTD. V/Q scan negative 07/2024.  RHC at diagnosis 04/2024 mPAP 25, PCWP 5, PVR 3.1 (Russel), CO/CI 6.5/3.1 (Russel)  - NYHA Class II/III   -- there has been a progression in PH in setting of under treatment as per HPI  - WHO group 1; CTD-PAH with RA hx, overlapping CTD-ILD  - TTE with pEF and new trivial pericardial effusion, TAPSE 2.09  - REVEAL risk score 10, high risk warranting dual therapy at intiation  - cont diuresis with Lasix 40 mg Qday, reports good UOP   -- modest improvement in NTproBNP, repeat 02/2025  - cont Sildenafil 20 mg TID and macitentan 10 mg QDay   -- discussed at length importance of adherence with Rx including risk of rebound symptoms with non consistent use  - expected medication side effects addressed; none ongoing at this time  - serial re-evaluate in 3-6 months with TTE, NTproBNP, CMP  - Vaccines up to date; influenza, pneumococcal, Tdap  - Pulmonary Rehab referral 2025, working on more portable oxygen, arthritis optimization and CT Chest repeat, order on follow up  - O2 supplementation required, goal SpO2 >92%  - given DPLD, future considerations include inhaled treprostinil  - if persistent high risk 6 months following initiation of therapy, plan for referral to PH/transplant center    Orders:  -     OXYGEN FOR HOME USE    2. ILD (interstitial lung disease)  Assessment & Plan:  CXR with progressive interstitial reticular opacification and now with CT Chest 02/2024 with DPLD characterized by honeycombing, traction bronchiectasis and peripheral reticulations. Imaging is not convincing for solely alveolar enlargement process in this patient with nicotine dependence. Now with PFT 03/2024 with severe diffusion deficit and exertional hypoxemia on 6MWT. Degree of respiratory symptoms and diffusion defect out of proportion with  parenchymal lung disease now s/p RHC w/ PAH.   - constellations of findings consistent with UIP 2/2 RA  - cont Prednisone daily  - recommend maintenance of MTX for management of arthritis  - planned CT Chest 02/2025 for 1 year follow up; will consider addition of MMF to regimen on follow up for progressive pulmonary involvement      3. Chronic respiratory failure with hypoxia  Assessment & Plan:  Due to pulmonary arterial hypertension. 6MWT with exertional hypoxia in this patient with PAH and DPLD. Cont NC supplementation with 3-4L with exertion. Improved exercise capacity with NC.  - limited mobility with oxygen tanks, will order portable oxygen      4. Nicotine dependence with current use  Assessment & Plan:  Dangers of cigarette smoking were reviewed with patient in detail. Patient was  counseled for 4 minutes.  Congratulated on ongoing efforts, now down to 1/2 ppd. Nicotine replacement options were discussed. Nicotine replacement was discussed-  not prescribed as per discussions.          Follow up in about 3 months (around 3/16/2025) for Routine follow up.      Case was discussed with patient; all questions were answered to patient's satisfaction and patient verbalized understanding.     Addie Hernandez MD  Pulmonary Medicine  [facility]  Phone: [phone]

## 2024-12-16 ENCOUNTER — OFFICE VISIT (OUTPATIENT)
Dept: PULMONOLOGY | Facility: CLINIC | Age: 64
End: 2024-12-16
Payer: MEDICARE

## 2024-12-16 VITALS
BODY MASS INDEX: 26.95 KG/M2 | DIASTOLIC BLOOD PRESSURE: 82 MMHG | HEIGHT: 72 IN | OXYGEN SATURATION: 92 % | SYSTOLIC BLOOD PRESSURE: 134 MMHG | WEIGHT: 199 LBS | HEART RATE: 127 BPM

## 2024-12-16 DIAGNOSIS — I27.21 PULMONARY ARTERIAL HYPERTENSION: Primary | ICD-10-CM

## 2024-12-16 DIAGNOSIS — F17.200 NICOTINE DEPENDENCE WITH CURRENT USE: ICD-10-CM

## 2024-12-16 DIAGNOSIS — J96.11 CHRONIC RESPIRATORY FAILURE WITH HYPOXIA: ICD-10-CM

## 2024-12-16 DIAGNOSIS — J84.9 ILD (INTERSTITIAL LUNG DISEASE): ICD-10-CM

## 2024-12-16 PROCEDURE — 3008F BODY MASS INDEX DOCD: CPT | Mod: CPTII,,, | Performed by: STUDENT IN AN ORGANIZED HEALTH CARE EDUCATION/TRAINING PROGRAM

## 2024-12-16 PROCEDURE — 99999 PR PBB SHADOW E&M-EST. PATIENT-LVL V: CPT | Mod: PBBFAC,,, | Performed by: STUDENT IN AN ORGANIZED HEALTH CARE EDUCATION/TRAINING PROGRAM

## 2024-12-16 PROCEDURE — 4010F ACE/ARB THERAPY RXD/TAKEN: CPT | Mod: CPTII,,, | Performed by: STUDENT IN AN ORGANIZED HEALTH CARE EDUCATION/TRAINING PROGRAM

## 2024-12-16 PROCEDURE — 3079F DIAST BP 80-89 MM HG: CPT | Mod: CPTII,,, | Performed by: STUDENT IN AN ORGANIZED HEALTH CARE EDUCATION/TRAINING PROGRAM

## 2024-12-16 PROCEDURE — 3075F SYST BP GE 130 - 139MM HG: CPT | Mod: CPTII,,, | Performed by: STUDENT IN AN ORGANIZED HEALTH CARE EDUCATION/TRAINING PROGRAM

## 2024-12-16 PROCEDURE — 1159F MED LIST DOCD IN RCRD: CPT | Mod: CPTII,,, | Performed by: STUDENT IN AN ORGANIZED HEALTH CARE EDUCATION/TRAINING PROGRAM

## 2024-12-16 PROCEDURE — 99214 OFFICE O/P EST MOD 30 MIN: CPT | Mod: S$PBB,25,, | Performed by: STUDENT IN AN ORGANIZED HEALTH CARE EDUCATION/TRAINING PROGRAM

## 2024-12-16 PROCEDURE — 1101F PT FALLS ASSESS-DOCD LE1/YR: CPT | Mod: CPTII,,, | Performed by: STUDENT IN AN ORGANIZED HEALTH CARE EDUCATION/TRAINING PROGRAM

## 2024-12-16 PROCEDURE — 1160F RVW MEDS BY RX/DR IN RCRD: CPT | Mod: CPTII,,, | Performed by: STUDENT IN AN ORGANIZED HEALTH CARE EDUCATION/TRAINING PROGRAM

## 2024-12-16 PROCEDURE — 99406 BEHAV CHNG SMOKING 3-10 MIN: CPT | Mod: S$PBB,,, | Performed by: STUDENT IN AN ORGANIZED HEALTH CARE EDUCATION/TRAINING PROGRAM

## 2024-12-16 PROCEDURE — 99215 OFFICE O/P EST HI 40 MIN: CPT | Mod: PBBFAC | Performed by: STUDENT IN AN ORGANIZED HEALTH CARE EDUCATION/TRAINING PROGRAM

## 2024-12-16 PROCEDURE — 3288F FALL RISK ASSESSMENT DOCD: CPT | Mod: CPTII,,, | Performed by: STUDENT IN AN ORGANIZED HEALTH CARE EDUCATION/TRAINING PROGRAM

## 2024-12-16 NOTE — PATIENT INSTRUCTIONS
In February, you have a CT of your CHEST. On the day of this imaging, stop by lab for a blood test.

## 2024-12-16 NOTE — ASSESSMENT & PLAN NOTE
Dangers of cigarette smoking were reviewed with patient in detail. Patient was  counseled for 4 minutes.  Congratulated on ongoing efforts, now down to 1/2 ppd. Nicotine replacement options were discussed. Nicotine replacement was discussed-  not prescribed as per discussions.

## 2024-12-16 NOTE — ASSESSMENT & PLAN NOTE
64 yo M with progressive dyspnea found to have severe diffusion defect with no lung restriction concerning for pulmonary vascular disease now s/p RHC consistent with PAH 2/2 CTD. V/Q scan negative 07/2024.  RHC at diagnosis 04/2024 mPAP 25, PCWP 5, PVR 3.1 (Russel), CO/CI 6.5/3.1 (Russel)  - NYHA Class II/III   -- there has been a progression in PH in setting of under treatment as per HPI  - WHO group 1; CTD-PAH with RA hx, overlapping CTD-ILD  - TTE with pEF and new trivial pericardial effusion, TAPSE 2.09  - REVEAL risk score 10, high risk warranting dual therapy at intiation  - cont diuresis with Lasix 40 mg Qday, reports good UOP   -- modest improvement in NTproBNP, repeat 02/2025  - cont Sildenafil 20 mg TID and macitentan 10 mg QDay   -- discussed at length importance of adherence with Rx including risk of rebound symptoms with non consistent use  - expected medication side effects addressed; none ongoing at this time  - serial re-evaluate in 3-6 months with TTE, NTproBNP, CMP  - Vaccines up to date; influenza, pneumococcal, Tdap  - Pulmonary Rehab referral 2025, working on more portable oxygen, arthritis optimization and CT Chest repeat, order on follow up  - O2 supplementation required, goal SpO2 >92%  - given DPLD, future considerations include inhaled treprostinil  - if persistent high risk 6 months following initiation of therapy, plan for referral to PH/transplant center

## 2024-12-16 NOTE — ASSESSMENT & PLAN NOTE
Due to pulmonary arterial hypertension. 6MWT with exertional hypoxia in this patient with PAH and DPLD. Cont NC supplementation with 3-4L with exertion. Improved exercise capacity with NC.  - limited mobility with oxygen tanks, will order portable oxygen

## 2024-12-16 NOTE — ASSESSMENT & PLAN NOTE
CXR with progressive interstitial reticular opacification and now with CT Chest 02/2024 with DPLD characterized by honeycombing, traction bronchiectasis and peripheral reticulations. Imaging is not convincing for solely alveolar enlargement process in this patient with nicotine dependence. Now with PFT 03/2024 with severe diffusion deficit and exertional hypoxemia on 6MWT. Degree of respiratory symptoms and diffusion defect out of proportion with parenchymal lung disease now s/p RHC w/ PAH.   - constellations of findings consistent with UIP 2/2 RA  - cont Prednisone daily  - recommend maintenance of MTX for management of arthritis  - planned CT Chest 02/2025 for 1 year follow up; will consider addition of MMF to regimen on follow up for progressive pulmonary involvement

## 2025-02-05 ENCOUNTER — HOSPITAL ENCOUNTER (OUTPATIENT)
Dept: RADIOLOGY | Facility: HOSPITAL | Age: 65
Discharge: HOME | End: 2025-02-05
Attending: STUDENT IN AN ORGANIZED HEALTH CARE EDUCATION/TRAINING PROGRAM
Payer: MEDICARE

## 2025-02-05 DIAGNOSIS — J84.9 ILD (INTERSTITIAL LUNG DISEASE): ICD-10-CM

## 2025-02-05 PROCEDURE — 71250 CT THORAX DX C-: CPT | Mod: TC

## 2025-03-11 ENCOUNTER — PATIENT MESSAGE (OUTPATIENT)
Dept: PULMONOLOGY | Facility: CLINIC | Age: 65
End: 2025-03-11
Payer: MEDICARE

## 2025-03-11 DIAGNOSIS — J96.11 CHRONIC RESPIRATORY FAILURE WITH HYPOXIA: ICD-10-CM

## 2025-03-11 DIAGNOSIS — I27.21 PULMONARY ARTERIAL HYPERTENSION: ICD-10-CM

## 2025-03-11 DIAGNOSIS — J84.9 ILD (INTERSTITIAL LUNG DISEASE): Primary | ICD-10-CM

## 2025-03-13 ENCOUNTER — OFFICE VISIT (OUTPATIENT)
Dept: FAMILY MEDICINE | Facility: CLINIC | Age: 65
End: 2025-03-13
Payer: MEDICARE

## 2025-03-13 VITALS
HEIGHT: 72 IN | DIASTOLIC BLOOD PRESSURE: 77 MMHG | OXYGEN SATURATION: 93 % | TEMPERATURE: 98 F | BODY MASS INDEX: 25.33 KG/M2 | HEART RATE: 111 BPM | WEIGHT: 187 LBS | RESPIRATION RATE: 18 BRPM | SYSTOLIC BLOOD PRESSURE: 131 MMHG

## 2025-03-13 DIAGNOSIS — M05.79 RHEUMATOID ARTHRITIS INVOLVING MULTIPLE SITES WITH POSITIVE RHEUMATOID FACTOR: Chronic | ICD-10-CM

## 2025-03-13 DIAGNOSIS — J31.0 CHRONIC RHINITIS: ICD-10-CM

## 2025-03-13 DIAGNOSIS — R73.9 HYPERGLYCEMIA: ICD-10-CM

## 2025-03-13 DIAGNOSIS — I10 PRIMARY HYPERTENSION: Primary | Chronic | ICD-10-CM

## 2025-03-13 DIAGNOSIS — R39.12 BENIGN PROSTATIC HYPERPLASIA WITH WEAK URINARY STREAM: Chronic | ICD-10-CM

## 2025-03-13 DIAGNOSIS — I27.21 PULMONARY ARTERIAL HYPERTENSION: ICD-10-CM

## 2025-03-13 DIAGNOSIS — N40.1 BENIGN PROSTATIC HYPERPLASIA WITH WEAK URINARY STREAM: Chronic | ICD-10-CM

## 2025-03-13 LAB
ANION GAP SERPL CALCULATED.3IONS-SCNC: 16 MMOL/L (ref 7–16)
BUN SERPL-MCNC: 15 MG/DL (ref 8–26)
BUN/CREAT SERPL: 14 (ref 6–20)
CALCIUM SERPL-MCNC: 9.4 MG/DL (ref 8.8–10)
CHLORIDE SERPL-SCNC: 94 MMOL/L (ref 98–107)
CHOLEST SERPL-MCNC: 161 MG/DL
CHOLEST/HDLC SERPL: 2 {RATIO}
CO2 SERPL-SCNC: 31 MMOL/L (ref 23–31)
CREAT SERPL-MCNC: 1.07 MG/DL (ref 0.72–1.25)
EGFR (NO RACE VARIABLE) (RUSH/TITUS): 77 ML/MIN/1.73M2
EST. AVERAGE GLUCOSE BLD GHB EST-MCNC: 100 MG/DL
GLUCOSE SERPL-MCNC: 126 MG/DL (ref 82–115)
HBA1C MFR BLD HPLC: 5.1 %
HDLC SERPL-MCNC: 81 MG/DL (ref 35–60)
LDLC SERPL CALC-MCNC: 65 MG/DL
NONHDLC SERPL-MCNC: 80 MG/DL
POTASSIUM SERPL-SCNC: 3.6 MMOL/L (ref 3.5–5.1)
SODIUM SERPL-SCNC: 137 MMOL/L (ref 136–145)
TRIGL SERPL-MCNC: 75 MG/DL (ref 34–140)
VLDLC SERPL-MCNC: 15 MG/DL

## 2025-03-13 PROCEDURE — 3288F FALL RISK ASSESSMENT DOCD: CPT | Mod: ,,, | Performed by: INTERNAL MEDICINE

## 2025-03-13 PROCEDURE — 83036 HEMOGLOBIN GLYCOSYLATED A1C: CPT | Mod: ,,, | Performed by: CLINICAL MEDICAL LABORATORY

## 2025-03-13 PROCEDURE — 1101F PT FALLS ASSESS-DOCD LE1/YR: CPT | Mod: ,,, | Performed by: INTERNAL MEDICINE

## 2025-03-13 PROCEDURE — 3078F DIAST BP <80 MM HG: CPT | Mod: ,,, | Performed by: INTERNAL MEDICINE

## 2025-03-13 PROCEDURE — 80048 BASIC METABOLIC PNL TOTAL CA: CPT | Mod: ,,, | Performed by: CLINICAL MEDICAL LABORATORY

## 2025-03-13 PROCEDURE — 3008F BODY MASS INDEX DOCD: CPT | Mod: ,,, | Performed by: INTERNAL MEDICINE

## 2025-03-13 PROCEDURE — 1159F MED LIST DOCD IN RCRD: CPT | Mod: ,,, | Performed by: INTERNAL MEDICINE

## 2025-03-13 PROCEDURE — 80061 LIPID PANEL: CPT | Mod: ,,, | Performed by: CLINICAL MEDICAL LABORATORY

## 2025-03-13 PROCEDURE — 3075F SYST BP GE 130 - 139MM HG: CPT | Mod: ,,, | Performed by: INTERNAL MEDICINE

## 2025-03-13 PROCEDURE — 99213 OFFICE O/P EST LOW 20 MIN: CPT | Mod: ,,, | Performed by: INTERNAL MEDICINE

## 2025-03-13 PROCEDURE — 1160F RVW MEDS BY RX/DR IN RCRD: CPT | Mod: ,,, | Performed by: INTERNAL MEDICINE

## 2025-03-13 RX ORDER — CHLORPHENIRAMINE MALEATE AND PHENYLEPHRINE HYDROCHLORIDE 4; 10 MG/1; MG/1
1 TABLET, COATED ORAL 2 TIMES DAILY PRN
Qty: 30 TABLET | Refills: 2 | Status: SHIPPED | OUTPATIENT
Start: 2025-03-13 | End: 2025-03-23

## 2025-03-13 NOTE — LETTER
AUTHORIZATION FOR RELEASE OF   CONFIDENTIAL INFORMATION    Dear [facility],    We are seeing Shamar Mendez, date of birth 1960, in the clinic at [facility]. Harish Puga MD is the patient's PCP. Shamar Mendez has an outstanding lab/procedure at the time we reviewed his chart. In order to help keep his health information updated, he has authorized us to request the following medical record(s):        (  )  MAMMOGRAM                                      (  )  COLONOSCOPY      (  )  PAP SMEAR                                          (  )  OUTSIDE LAB RESULTS     (  )  DEXA SCAN                                          (  )  EYE EXAM            (  )  FOOT EXAM                                          (  )  ENTIRE RECORD     (  )  OUTSIDE IMMUNIZATIONS                 ( X )  One year of records         Please fax records to [facility], Eakes, Patrick H, MD, [phone]     If you have any questions, please contact Ana at [phone].           Patient Name: Shamar Mendez  : 1960  Patient Phone #: [phone]

## 2025-03-13 NOTE — PROGRESS NOTES
New Clinic Note    Patient Name:  Shamar Mendez is a 64 y.o. male     Chief Complaint:    Chief Complaint   Patient presents with    Hyperlipidemia     Wants his cholesterol checked    Nasal Congestion     States he is having nasal congestion and it is worse at night so he has been sleeping in recliner. States it has been going on a couple months. States he has a place on the inside of his left nostril that will bleed sometimes    Health Maintenance     HIV Screening Never done - denies  RSV Vaccine (Age 60+ and Pregnant patients)(1 - Risk 60-74 years 1-dose series) Never done - denies  Pneumococcal Vaccines (Age 50+)(2 of 2 - PCV) due on 04/04/2023 - denies  COVID-19 Vaccine(4 - 2024-25 season) due on 09/01/2024 - denies  LDCT Lung Screen due on 02/02/2025      Did not bring medications        Subjective  Hyperlipidemia  Associated symptoms include shortness of breath. Pertinent negatives include no chest pain.          Current Medications[1]   Past Medical History:   Diagnosis Date    Arthritis     BPH (benign prostatic hyperplasia)     Carpal tunnel syndrome, bilateral     Hidradenitis suppurativa     History of knee replacement     Hypertension     Opioid dependence     Rheumatoid arthritis       Past Surgical History:   Procedure Laterality Date    CARPAL TUNNEL RELEASE Bilateral     KNEE ARTHROSCOPY Left     NECK SURGERY      RIGHT HEART CATHETERIZATION Right 4/26/2024    Procedure: INSERTION, CATHETER, RIGHT HEART;  Surgeon: Sage Hanna MD;  Location: [facility] CATH LAB;  Service: Cardiology;  Laterality: Right;    TONSILLECTOMY      TOTAL KNEE ARTHROPLASTY Left       Family History   Problem Relation Name Age of Onset    Heart disease Mother      Hypertension Mother      Diabetes Father      Heart disease Father      Diabetes Maternal Grandmother      Diabetes Maternal Grandfather      Diabetes Paternal Grandfather      Melanoma Neg Hx        Social History[2]     Review of Systems    Constitutional:  Negative for fatigue and fever.   HENT:  Negative for nasal congestion and sore throat.    Respiratory:  Positive for shortness of breath. Negative for cough and wheezing.    Cardiovascular:  Negative for chest pain and palpitations.   Gastrointestinal:  Negative for abdominal pain and blood in stool.   Genitourinary:  Negative for dysuria.   Musculoskeletal:  Positive for arthralgias. Negative for back pain and neck pain.   Integumentary:  Negative for rash and mole/lesion.   Neurological:  Negative for dizziness, headaches and memory loss.   Psychiatric/Behavioral:  Negative for agitation. The patient is not nervous/anxious.         Objective:  /77 (BP Location: Left arm, Patient Position: Sitting)   Pulse (!) 111   Temp 98.2 °F (36.8 °C) (Oral)   Resp 18   Ht 6' (1.829 m)   Wt 84.8 kg (187 lb)   SpO2 (!) 93%   PF (!) 3 L/min   BMI 25.36 kg/m²      Physical Exam  Constitutional:       Appearance: Normal appearance.   HENT:      Head: Normocephalic and atraumatic.      Right Ear: External ear normal.      Left Ear: External ear normal.      Nose: Nose normal.   Eyes:      Extraocular Movements: Extraocular movements intact.      Conjunctiva/sclera: Conjunctivae normal.      Pupils: Pupils are equal, round, and reactive to light.   Cardiovascular:      Rate and Rhythm: Normal rate and regular rhythm.      Pulses: Normal pulses.      Heart sounds: Normal heart sounds. No murmur heard.     No friction rub. No gallop.   Pulmonary:      Effort: Pulmonary effort is normal.      Breath sounds: No wheezing, rhonchi or rales.   Abdominal:      General: Abdomen is flat.      Palpations: Abdomen is soft.   Musculoskeletal:      Cervical back: Normal range of motion and neck supple.      Right lower leg: No edema.      Left lower leg: No edema.   Skin:     General: Skin is warm and dry.      Findings: No rash.   Neurological:      General: No focal deficit present.      Mental Status: He is  alert and oriented to person, place, and time.      Cranial Nerves: No cranial nerve deficit.   Psychiatric:         Mood and Affect: Mood normal.          Assessment and Plan    Primary hypertension  -     Basic Metabolic Panel; Future; Expected date: 03/13/2025  -     Lipid Panel; Future; Expected date: 03/13/2025    Rheumatoid arthritis involving multiple sites with positive rheumatoid factor    Benign prostatic hyperplasia with weak urinary stream    Pulmonary arterial hypertension    Hyperglycemia  -     Hemoglobin A1C; Future; Expected date: 03/13/2025    Chronic rhinitis  -     chlorpheniramine-phenylephrine (ED A-HIST) 4-10 mg per tablet; Take 1 tablet by mouth 2 (two) times daily as needed for Congestion.  Dispense: 30 tablet; Refill: 2         Problem List Items Addressed This Visit       Rheumatoid arthritis (Chronic)    Hypertension - Primary (Chronic)    Relevant Orders    Basic Metabolic Panel    Lipid Panel    BPH (benign prostatic hyperplasia) (Chronic)    Pulmonary arterial hypertension     Other Visit Diagnoses         Hyperglycemia        Relevant Orders    Hemoglobin A1C      Chronic rhinitis        Relevant Medications    chlorpheniramine-phenylephrine (ED A-HIST) 4-10 mg per tablet         2-HQC-sydfad he is no longer taking lotrel because he is on lasix, bp was dropping low-bmp  2-Hyperglycemia in the past-A1c  3-Pulm HTN-has f/u soon with Dr. Hernandez  4-RA-on plaquenil, states he will schedule appt for eye exam  1-IOV-fomjfb-has f/u soon-get Urology records    Follow up in about 6 months (around 9/13/2025).          [1]   Current Outpatient Medications:     acetaminophen (TYLENOL) 500 MG tablet, Take 500-1,000 mg by mouth every 6 (six) hours as needed for Pain., Disp: , Rfl:     ambrisentan (LETAIRIS) 5 MG Tab, Take 5 mg by mouth once daily., Disp: , Rfl:     amLODIPine-benazepriL (LOTREL) 10-40 mg per capsule, TAKE 1 CAPSULE BY MOUTH ONCE  DAILY, Disp: 90 capsule, Rfl: 0     buprenorphine-naloxone (SUBOXONE) 8-2 mg Film, Place 0.5 each under the tongue once daily. , Disp: , Rfl:     chlorpheniramine-phenylephrine (ED A-HIST) 4-10 mg per tablet, Take 1 tablet by mouth 2 (two) times daily as needed for Congestion., Disp: 30 tablet, Rfl: 2    finasteride (PROSCAR) 5 mg tablet, Take 5 mg by mouth once daily., Disp: , Rfl:     folic acid (FOLVITE) 1 MG tablet, Take 1 tablet (1,000 mcg total) by mouth once daily., Disp: 90 tablet, Rfl: 1    furosemide (LASIX) 20 MG tablet, Take 2 tablets (40 mg total) by mouth once daily., Disp: 60 tablet, Rfl: 11    HUMIRA,CF, PEN 40 mg/0.4 mL PnKt, , Disp: , Rfl:     hydrOXYchloroQUINE (PLAQUENIL) 200 mg tablet, Take 1 tablet (200 mg total) by mouth 2 (two) times daily., Disp: 180 tablet, Rfl: 1    macitentan 10 mg Tab, Take 1 tablet (10 mg total) by mouth once daily., Disp: 30 tablet, Rfl: 11    methotrexate 2.5 MG Tab, Take 15 mg by mouth., Disp: , Rfl:     omeprazole (PRILOSEC) 20 MG capsule, Take 20 mg by mouth once daily. , Disp: , Rfl:     pilocarpine (SALAGEN) 5 MG Tab, Take 5 mg by mouth., Disp: , Rfl:     predniSONE (DELTASONE) 5 MG tablet, Take 1 tablet (5 mg total) by mouth once daily. With food., Disp: 90 tablet, Rfl: 0    sildenafil (REVATIO) 20 mg Tab, Take 1 tablet (20 mg total) by mouth 3 (three) times daily., Disp: 90 tablet, Rfl: 11    tamsulosin (FLOMAX) 0.4 mg Cap, Take 2 capsules (0.8 mg total) by mouth once daily., Disp: 180 capsule, Rfl: 1  [2]   Social History  Tobacco Use    Smoking status: Every Day     Current packs/day: 0.50     Average packs/day: 1 pack/day for 50.2 years (49.9 ttl pk-yrs)     Types: Cigarettes     Start date: 1975     Passive exposure: Current    Smokeless tobacco: Former   Substance Use Topics    Alcohol use: Never

## 2025-03-14 ENCOUNTER — RESULTS FOLLOW-UP (OUTPATIENT)
Dept: FAMILY MEDICINE | Facility: CLINIC | Age: 65
End: 2025-03-14

## 2025-03-17 ENCOUNTER — CLINICAL SUPPORT (OUTPATIENT)
Dept: PULMONOLOGY | Facility: HOSPITAL | Age: 65
End: 2025-03-17
Attending: STUDENT IN AN ORGANIZED HEALTH CARE EDUCATION/TRAINING PROGRAM
Payer: MEDICARE

## 2025-03-17 ENCOUNTER — OFFICE VISIT (OUTPATIENT)
Dept: PULMONOLOGY | Facility: CLINIC | Age: 65
End: 2025-03-17
Payer: MEDICARE

## 2025-03-17 ENCOUNTER — PATIENT MESSAGE (OUTPATIENT)
Dept: PULMONOLOGY | Facility: CLINIC | Age: 65
End: 2025-03-17
Payer: MEDICARE

## 2025-03-17 VITALS
SYSTOLIC BLOOD PRESSURE: 104 MMHG | WEIGHT: 184.94 LBS | HEART RATE: 108 BPM | RESPIRATION RATE: 16 BRPM | OXYGEN SATURATION: 92 % | DIASTOLIC BLOOD PRESSURE: 74 MMHG | HEIGHT: 72 IN | BODY MASS INDEX: 25.05 KG/M2

## 2025-03-17 VITALS — WEIGHT: 185 LBS | BODY MASS INDEX: 25.06 KG/M2 | HEIGHT: 72 IN

## 2025-03-17 DIAGNOSIS — I27.21 PULMONARY ARTERIAL HYPERTENSION: Primary | ICD-10-CM

## 2025-03-17 DIAGNOSIS — J30.89 SEASONAL ALLERGIC RHINITIS DUE TO OTHER ALLERGIC TRIGGER: ICD-10-CM

## 2025-03-17 DIAGNOSIS — I27.21 PULMONARY ARTERIAL HYPERTENSION: ICD-10-CM

## 2025-03-17 DIAGNOSIS — J96.11 CHRONIC RESPIRATORY FAILURE WITH HYPOXIA: ICD-10-CM

## 2025-03-17 DIAGNOSIS — J84.9 ILD (INTERSTITIAL LUNG DISEASE): ICD-10-CM

## 2025-03-17 PROBLEM — R06.02 SHORTNESS OF BREATH: Status: RESOLVED | Noted: 2024-02-21 | Resolved: 2025-03-17

## 2025-03-17 PROCEDURE — 99215 OFFICE O/P EST HI 40 MIN: CPT | Mod: PBBFAC,25 | Performed by: STUDENT IN AN ORGANIZED HEALTH CARE EDUCATION/TRAINING PROGRAM

## 2025-03-17 PROCEDURE — 1159F MED LIST DOCD IN RCRD: CPT | Mod: CPTII,,, | Performed by: STUDENT IN AN ORGANIZED HEALTH CARE EDUCATION/TRAINING PROGRAM

## 2025-03-17 PROCEDURE — 3288F FALL RISK ASSESSMENT DOCD: CPT | Mod: CPTII,,, | Performed by: STUDENT IN AN ORGANIZED HEALTH CARE EDUCATION/TRAINING PROGRAM

## 2025-03-17 PROCEDURE — 3074F SYST BP LT 130 MM HG: CPT | Mod: CPTII,,, | Performed by: STUDENT IN AN ORGANIZED HEALTH CARE EDUCATION/TRAINING PROGRAM

## 2025-03-17 PROCEDURE — 1126F AMNT PAIN NOTED NONE PRSNT: CPT | Mod: CPTII,,, | Performed by: STUDENT IN AN ORGANIZED HEALTH CARE EDUCATION/TRAINING PROGRAM

## 2025-03-17 PROCEDURE — 3078F DIAST BP <80 MM HG: CPT | Mod: CPTII,,, | Performed by: STUDENT IN AN ORGANIZED HEALTH CARE EDUCATION/TRAINING PROGRAM

## 2025-03-17 PROCEDURE — 3044F HG A1C LEVEL LT 7.0%: CPT | Mod: CPTII,,, | Performed by: STUDENT IN AN ORGANIZED HEALTH CARE EDUCATION/TRAINING PROGRAM

## 2025-03-17 PROCEDURE — 99215 OFFICE O/P EST HI 40 MIN: CPT | Mod: S$PBB,25,, | Performed by: STUDENT IN AN ORGANIZED HEALTH CARE EDUCATION/TRAINING PROGRAM

## 2025-03-17 PROCEDURE — 94618 PULMONARY STRESS TESTING: CPT | Mod: 26,,, | Performed by: STUDENT IN AN ORGANIZED HEALTH CARE EDUCATION/TRAINING PROGRAM

## 2025-03-17 PROCEDURE — 99999 PR PBB SHADOW E&M-EST. PATIENT-LVL V: CPT | Mod: PBBFAC,,, | Performed by: STUDENT IN AN ORGANIZED HEALTH CARE EDUCATION/TRAINING PROGRAM

## 2025-03-17 PROCEDURE — 1101F PT FALLS ASSESS-DOCD LE1/YR: CPT | Mod: CPTII,,, | Performed by: STUDENT IN AN ORGANIZED HEALTH CARE EDUCATION/TRAINING PROGRAM

## 2025-03-17 PROCEDURE — 3008F BODY MASS INDEX DOCD: CPT | Mod: CPTII,,, | Performed by: STUDENT IN AN ORGANIZED HEALTH CARE EDUCATION/TRAINING PROGRAM

## 2025-03-17 PROCEDURE — 1160F RVW MEDS BY RX/DR IN RCRD: CPT | Mod: CPTII,,, | Performed by: STUDENT IN AN ORGANIZED HEALTH CARE EDUCATION/TRAINING PROGRAM

## 2025-03-17 PROCEDURE — 94618 PULMONARY STRESS TESTING: CPT

## 2025-03-17 RX ORDER — AZELASTINE 1 MG/ML
2 SPRAY, METERED NASAL 2 TIMES DAILY
Qty: 30 ML | Refills: 0 | Status: SHIPPED | OUTPATIENT
Start: 2025-03-17 | End: 2026-03-17

## 2025-03-17 RX ORDER — CETIRIZINE HYDROCHLORIDE 10 MG/1
10 TABLET ORAL DAILY
Qty: 30 TABLET | Refills: 11 | Status: SHIPPED | OUTPATIENT
Start: 2025-03-17 | End: 2026-03-17

## 2025-03-17 RX ORDER — SILDENAFIL CITRATE 20 MG/1
20 TABLET ORAL 3 TIMES DAILY
Qty: 90 TABLET | Refills: 11 | Status: SHIPPED | OUTPATIENT
Start: 2025-03-17 | End: 2026-03-17

## 2025-03-17 RX ORDER — MONTELUKAST SODIUM 10 MG/1
10 TABLET ORAL NIGHTLY
Qty: 30 TABLET | Refills: 2 | Status: SHIPPED | OUTPATIENT
Start: 2025-03-17 | End: 2025-06-15

## 2025-03-17 RX ORDER — FLUTICASONE PROPIONATE 50 MCG
2 SPRAY, SUSPENSION (ML) NASAL 2 TIMES DAILY
Qty: 18.2 ML | Refills: 2 | Status: SHIPPED | OUTPATIENT
Start: 2025-03-17

## 2025-03-17 RX ORDER — AMBRISENTAN 5 MG/1
5 TABLET, FILM COATED ORAL DAILY
Qty: 30 TABLET | Refills: 11 | Status: SHIPPED | OUTPATIENT
Start: 2025-03-17 | End: 2025-03-18

## 2025-03-17 NOTE — PROCEDURES
SIX MINUTE WALK DISTANCE  BASELINE VITALS  Heart rate 102, /75, SpO2 81% on room air     END OF STUDY VITALS:  Heart rate 115, /75, SpO2 90% on 4 L nasal cannula    RECOVERY VITALS:  After 1 min rest  Heart rate 107, /86, SpO2 90% on 4 L nasal cannula    Patient walked 674 ft with 0 rests.   SpO2 ashley was 88% at 4 minutes.       IMPRESSION:  Patient needs 3L NC at rest. Patient needs 4L NC with activity.      Addie Hernandez MD  Pulmonary and Critical Care  [facility]

## 2025-03-17 NOTE — PROGRESS NOTES
[facility]  Pulmonology  ESTABLISHED VISIT     Patient Name:  Shamar Mendez  Primary Care Provider: Harish Puga MD  Date of Service: 03/18/2025    Chief Complaint: Shortness of breath    SUBJECTIVE   HPI:  Shamar Mendez is a 64 y.o. male with pulmonary arterial hypertension (Dx 04/2024: mPAP 25, PCWP 5, PVR 3.1, Russel) , RA, chronic immunosuppression (on MTX, Humira and prednisone) and nicotine dependence who presents to follow up on shortness of breath. Last seen 12/2024 with plan for CT Chest, 6MWT and labwork.     Andrea reports feeling well on this assessment.  Since change in season, he has noticed nasal blockage which effects his breathing during the daytime and also nighttime.  Has been wakening up more tired in the mornings.  He is mouth breathing at night due to his nasal congestion.  He has had episodes of nose bleeding triggered by blowing his nose that has easily stopped with pressure to left naris.  He has had no ED presentations or hospitalizations since last seen.  He is smoking approximately half a pack daily.  He has been taking his medications which includes Lasix daily, Letairis and sildenafil.    Initial HPI  Andrea reports having progression in his shortness of breath more significant in the past 3 months.  He describes this as shortness of breath that is present upon waking up, improves with rest and is worse with exertion.  He now has shortness of breath with walking over to his mailbox.  He has a cough that is infrequent and productive of mainly clear saliva.  He has a feeling of congestion that feels like it is stuck at the back of his throat.  Has no hospitalizations for any respiratory symptoms he is aware of.  We discussed the findings of his imaging as well as planned workup as outlined and plan below.     08/2024: reports that he has intermittent shortness of breath.  He wonders whether his breathing will ever get better to be able to perform activities without  requiring oxygen.  We discussed his initial symptoms where he had shortness of breath with walking to his mailbox, and since starting oxygen therapy has had an interval increase in how much he can do with the supportive oxygen.  He did stop taking his sildenafil for few weeks because he had concerns that it was making his breathing worse, he did notice that his breathing was worse with stopping the medication.  He has been using his Lasix daily and reports good urine output (40 mg daily).  He was approved for macitentan, however, has not received the medication from his pharmacy.  About 2 months ago he has methotrexate was stopped by another provider, however, he has had worsening of his arthralgias and has had the methotrexate reinstated.  We discussed his new diagnosis of pulmonary arterial hypertension, expected course and importance of medication adherence.      12/2024: states that he has noticed an improvement in his breathing in the past 2 months.  He states that he has more energy to perform activities as home as well as walking.  He is using his oxygen all the time as previously prescribed.  He is taking his sildenafil 3 times a day and macitentan once daily.  He is still having a good diuretic effect and has increased frequency and volume with his output.  We discussed his previous lab test that demonstrated an improvement in his NT pro BNP.  He is planned for CT chest in February and repeat lab work at that time.  He has decreased his smoking to half a pack a day; earlier this year he was at 1 pack daily with peak 2ppd. We reviewed the results of his TTE.    Past Medical History:   Diagnosis Date    Arthritis     BPH (benign prostatic hyperplasia)     Carpal tunnel syndrome, bilateral     Hidradenitis suppurativa     History of knee replacement     Hypertension     Opioid dependence     Rheumatoid arthritis        Past Surgical History:   Procedure Laterality Date    CARPAL TUNNEL RELEASE Bilateral      KNEE ARTHROSCOPY Left     NECK SURGERY      RIGHT HEART CATHETERIZATION Right 4/26/2024    Procedure: INSERTION, CATHETER, RIGHT HEART;  Surgeon: Sage Hanna MD;  Location: [facility] CATH LAB;  Service: Cardiology;  Laterality: Right;    TONSILLECTOMY      TOTAL KNEE ARTHROPLASTY Left        Family History   Problem Relation Name Age of Onset    Heart disease Mother      Hypertension Mother      Diabetes Father      Heart disease Father      Diabetes Maternal Grandmother      Diabetes Maternal Grandfather      Diabetes Paternal Grandfather      Melanoma Neg Hx          Social History     Socioeconomic History    Marital status:    Tobacco Use    Smoking status: Every Day     Current packs/day: 0.50     Average packs/day: 1 pack/day for 50.2 years (49.9 ttl pk-yrs)     Types: Cigarettes     Start date: 1975     Passive exposure: Current    Smokeless tobacco: Former   Substance and Sexual Activity    Alcohol use: Never     Social Drivers of Health     Financial Resource Strain: Medium Risk (11/13/2024)    Overall Financial Resource Strain (CARDIA)     Difficulty of Paying Living Expenses: Somewhat hard   Food Insecurity: Food Insecurity Present (11/13/2024)    Hunger Vital Sign     Worried About Running Out of Food in the Last Year: Never true     Ran Out of Food in the Last Year: Sometimes true   Physical Activity: Unknown (11/13/2024)    Exercise Vital Sign     Days of Exercise per Week: Patient declined   Stress: No Stress Concern Present (11/13/2024)    [employer] - Occupational Stress Questionnaire     Feeling of Stress : Only a little   Housing Stability: Unknown (11/13/2024)    Housing Stability Vital Sign     Unable to Pay for Housing in the Last Year: No       Social History     Social History Narrative    Not on file       Review of patient's allergies indicates:   Allergen Reactions    Wellbutrin [bupropion hcl] Other (See Comments)     Mental status changes         Medications: Medications reviewed to include over the counter medications.    Review of Systems: A focused ROS was completed and found to be negative except for that mentioned above.      OBJECTIVE   PHYSICAL EXAM:  Vitals:    03/17/25 1329   BP: 104/74   BP Location: Left arm   Patient Position: Sitting   Pulse: 108   Resp: 16   SpO2: (!) 92%   Weight: 83.9 kg (184 lb 15.5 oz)   Height: 6' (1.829 m)       GENERAL: NAD  RESPIRATORY: mild inspiratory and expiratory bibasilar crackeles, no wheezing, rales or rhonchi, on NC  CARDIOVASCULAR: Regular rate and rhythm, no murmurs rubs or gallops.  SKIN: no rash, jaundice, ecchymosis or ulcers  MUSCULOSKELETAL: No clubbing or cyanosis; no pedal edema, ulnar deviation in bilateral hands more prominent in L hand  NEUROLOGIC: AO ×3, no gross deficits    LABS:  Lab studies reviewed and notable for H/H 15/47 (04/2024), CO2 31, SCr 1.07 (03/2025)     01/29/24 09:31 05/07/24 12:16 08/07/24 11:56 02/05/25 12:35   NT-proBNP 243 (H) 3,701 (H) 2,106 (H) 825 (H)     IMAGING:  Imaging reviewed and notable for CT Chest 02/2024 bilateral upper lobe emphysema (centrilobular, paraseptal and panacinar), reticulation of the lung parenchyma in bilateral peripheral lung fields with mid and lower lung predominance, honeycombing and associated traction bronchiectasis present; official report reviewed as well. Serial CXR reviewed from 05/2020, 10/2014, 07/2021 and 09/2021 with progression in interstitial pulmonary densities     V/Q scan 07/2024:   Impression: Ventilation perfusion defects mostly matched, recommend CT to fully evaluate these areas.  This indicates intermediate probability for pulmonary embolism.    TTE:  02/2024:  LVEF 60-65%, normal diastolic function, RV size normal, TAPSE 2.02, normal LA size, normal RA size, mild MR, mild TR, PASP 44    11/2024: LVEF 50-55%, normal diastolic function, normal RV size, TAPSE 2.09, mild MR, trace TR, IVC 3 mmHg, trivial pericardial  effusion    RHC 04/2024:  mPAP 25, PCWP 5, PVR 3.1 (Russel), CO/CI 6.5/3.1 (Russel)    LUNG FUNCTION TESTING:   SPIROMETRY/PFT:  03/2024 Pre Post   FVC 4.29/-0.70 4.13/-0.92   FEV1 2.71/-1.63 2.72/-1.62   FEV1/FVC 63/-1.71 66/-1.34   TLC 7.26/-0.16    FRC  4.60/0.76    RV 2.97/1.15    DLCO 7.38/-6.22 (26%)    The diffusion capacity met repeatability criteria. There is a severe diffusion defect.     6MWD:  Date Distance (ft) Resting SpO2; Renato SpO2 O2 Required   03/2024 1104 (336.5m) 91%, RA; 84% 3.5L   03/2025 674 81%, RA; 88%  Knee pain reported 4L     ASSESSMENT & PLAN     1. Pulmonary arterial hypertension  Assessment & Plan:  64 yo M with progressive dyspnea found to have severe diffusion defect with no lung restriction concerning for pulmonary vascular disease now s/p RHC consistent with PAH 2/2 CTD. V/Q scan negative 07/2024.  RHC at diagnosis 04/2024 mPAP 25, PCWP 5, PVR 3.1 (Russel), CO/CI 6.5/3.1 (Russel)  - NYHA Class II  - WHO group 1; CTD-PAH with RA hx, overlapping CTD-ILD  - TTE 11/2024 with pEF and new trivial pericardial effusion, TAPSE 2.09  - REVEAL risk score high risk warranting dual therapy at initiation; improved NTproBNP  - cont diuresis with Lasix 40 mg Qday   -- improvement in NTproBNP, repeat 09/2025  - cont Sildenafil 20 mg TID  - cont ambrisentan at increased dose 10 mg QDay  - expected medication side effects addressed; none ongoing at this time  - serial re-evaluate in 3-6 months with TTE, NTproBNP, CMP   -- order on follow up while on increased dosing (planned labs by Rheumatology team days prior to f/u assessment)  - Vaccines up to date; influenza, pneumococcal, Tdap  - Pulmonary Rehab referral 2025; plan to place referral on follow up, ongoing optimization of arthritis  - O2 supplementation required, goal SpO2 >92%  - given DPLD, future considerations include inhaled treprostinil  - if persistent high risk 6 months, will re-address referral to PH/transplant center    Orders:  -      sildenafil (REVATIO) 20 mg Tab; Take 1 tablet (20 mg total) by mouth 3 (three) times daily.  Dispense: 90 tablet; Refill: 11  -     Discontinue: ambrisentan (LETAIRIS) 5 MG Tab; Take 1 tablet (5 mg total) by mouth once daily.  Dispense: 30 tablet; Refill: 11  -     ambrisentan (LETAIRIS) 10 MG Tab; Take 1 tablet (10 mg total) by mouth once daily.  Dispense: 30 tablet; Refill: 11    2. Seasonal allergic rhinitis due to other allergic trigger  Assessment & Plan:  Symptoms significant enough that they are interfering with oxygen delivery.  Recommend against phenylephrine which was stopped due to hypertensive episodes and patient with symptoms post use.  Management as follows:   - start azelastine 2 sprays b.i.d. and Flonase 2 sprays b.i.d.   - start Singulair nightly   - start cetirizine daily    Orders:  -     azelastine (ASTELIN) 137 mcg (0.1 %) nasal spray; 2 sprays (274 mcg total) by Nasal route 2 (two) times daily.  Dispense: 30 mL; Refill: 0  -     fluticasone propionate (FLONASE) 50 mcg/actuation nasal spray; 2 sprays (100 mcg total) by Each Nostril route 2 (two) times a day.  Dispense: 18.2 mL; Refill: 2  -     cetirizine (ZYRTEC) 10 MG tablet; Take 1 tablet (10 mg total) by mouth once daily.  Dispense: 30 tablet; Refill: 11  -     montelukast (SINGULAIR) 10 mg tablet; Take 1 tablet (10 mg total) by mouth every evening.  Dispense: 30 tablet; Refill: 2    3. Chronic respiratory failure with hypoxia  Assessment & Plan:  Due to pulmonary arterial hypertension.  ILD relatively stable on imaging.  6MWT with resting and exertional hypoxia.  Management as follows:   - cont NC 3-4L with rest-exertion with portable oxygen      4. ILD (interstitial lung disease)  Assessment & Plan:  CXR with progressive interstitial reticular opacification and now with CT Chest 02/2024 with DPLD characterized by honeycombing, traction bronchiectasis and peripheral reticulations. Imaging is not convincing for solely alveolar enlargement  process in this patient with nicotine dependence. Now with PFT 03/2024 with severe diffusion deficit and exertional hypoxemia on 6MWT. Degree of respiratory symptoms and diffusion defect out of proportion with parenchymal lung disease now s/p RHC w/ PAH.   - constellations of findings consistent with UIP 2/2 RA   -- lung changes precede therapy with MTX  - cont Prednisone daily  - recommend maintenance of MTX for management of arthritis, resumed by Rheumatology team  - CT Chest with mild progression in basilar honey combing; will repeat CT Chest in 6 months and plan to order at time of follow up  -- will consider addition of MMF to regimen for progressive pulmonary involvement            Follow up in about 6 months (around 9/17/2025) for Routine follow up.      Case was discussed with patient; all questions were answered to patient's satisfaction and patient verbalized understanding.     Addie Hernandez MD  Pulmonary Medicine  [facility]  Phone: [phone]

## 2025-03-18 PROBLEM — J30.2 SEASONAL ALLERGIC RHINITIS: Status: ACTIVE | Noted: 2025-03-18

## 2025-03-18 RX ORDER — AMBRISENTAN 10 MG/1
10 TABLET, FILM COATED ORAL DAILY
Qty: 30 TABLET | Refills: 11 | Status: SHIPPED | OUTPATIENT
Start: 2025-03-18 | End: 2026-03-18

## 2025-03-18 NOTE — ASSESSMENT & PLAN NOTE
CXR with progressive interstitial reticular opacification and now with CT Chest 02/2024 with DPLD characterized by honeycombing, traction bronchiectasis and peripheral reticulations. Imaging is not convincing for solely alveolar enlargement process in this patient with nicotine dependence. Now with PFT 03/2024 with severe diffusion deficit and exertional hypoxemia on 6MWT. Degree of respiratory symptoms and diffusion defect out of proportion with parenchymal lung disease now s/p RHC w/ PAH.   - constellations of findings consistent with UIP 2/2 RA   -- lung changes precede therapy with MTX  - cont Prednisone daily  - recommend maintenance of MTX for management of arthritis, resumed by Rheumatology team  - CT Chest with mild progression in basilar honey combing; will repeat CT Chest in 6 months and plan to order at time of follow up  -- will consider addition of MMF to regimen for progressive pulmonary involvement

## 2025-03-18 NOTE — ASSESSMENT & PLAN NOTE
62 yo M with progressive dyspnea found to have severe diffusion defect with no lung restriction concerning for pulmonary vascular disease now s/p RHC consistent with PAH 2/2 CTD. V/Q scan negative 07/2024.  RHC at diagnosis 04/2024 mPAP 25, PCWP 5, PVR 3.1 (Russel), CO/CI 6.5/3.1 (Russel)  - NYHA Class II  - WHO group 1; CTD-PAH with RA hx, overlapping CTD-ILD  - TTE 11/2024 with pEF and new trivial pericardial effusion, TAPSE 2.09  - REVEAL risk score high risk warranting dual therapy at initiation; improved NTproBNP  - cont diuresis with Lasix 40 mg Qday   -- improvement in NTproBNP, repeat 09/2025  - cont Sildenafil 20 mg TID  - cont ambrisentan at increased dose 10 mg QDay  - expected medication side effects addressed; none ongoing at this time  - serial re-evaluate in 3-6 months with TTE, NTproBNP, CMP   -- order on follow up while on increased dosing (planned labs by Rheumatology team days prior to f/u assessment)  - Vaccines up to date; influenza, pneumococcal, Tdap  - Pulmonary Rehab referral 2025; plan to place referral on follow up, ongoing optimization of arthritis  - O2 supplementation required, goal SpO2 >92%  - given DPLD, future considerations include inhaled treprostinil  - if persistent high risk 6 months, will re-address referral to PH/transplant center

## 2025-03-18 NOTE — ASSESSMENT & PLAN NOTE
Due to pulmonary arterial hypertension.  ILD relatively stable on imaging.  6MWT with resting and exertional hypoxia.  Management as follows:   - cont NC 3-4L with rest-exertion with portable oxygen

## 2025-03-18 NOTE — ASSESSMENT & PLAN NOTE
Symptoms significant enough that they are interfering with oxygen delivery.  Recommend against phenylephrine which was stopped due to hypertensive episodes and patient with symptoms post use.  Management as follows:   - start azelastine 2 sprays b.i.d. and Flonase 2 sprays b.i.d.   - start Singulair nightly   - start cetirizine daily

## 2025-03-21 ENCOUNTER — TELEPHONE (OUTPATIENT)
Dept: PULMONOLOGY | Facility: CLINIC | Age: 65
End: 2025-03-21
Payer: MEDICARE

## 2025-03-21 NOTE — TELEPHONE ENCOUNTER
----- Message from [place] sent at 3/21/2025  9:56 AM CDT -----  Regarding: [facility]  Pharmacy is calling to request assistance with RxPharmacy name and phone number: [facility]: [phone] contact: [place] Name: Shamar LongPrescription Name: ambrisentan (LETAIRIS) 10 MG Tab What do they need to clarify?:Mg Amount; she said they received 2 orders with different mg's

## 2025-05-16 DIAGNOSIS — J30.89 SEASONAL ALLERGIC RHINITIS DUE TO OTHER ALLERGIC TRIGGER: ICD-10-CM

## 2025-05-16 RX ORDER — FLUTICASONE PROPIONATE 50 MCG
SPRAY, SUSPENSION (ML) NASAL
Qty: 16 G | Refills: 5 | Status: SHIPPED | OUTPATIENT
Start: 2025-05-16

## 2025-06-12 DIAGNOSIS — J30.89 SEASONAL ALLERGIC RHINITIS DUE TO OTHER ALLERGIC TRIGGER: ICD-10-CM

## 2025-06-12 RX ORDER — MONTELUKAST SODIUM 10 MG/1
10 TABLET ORAL NIGHTLY
Qty: 30 TABLET | Refills: 11 | Status: SHIPPED | OUTPATIENT
Start: 2025-06-12 | End: 2026-06-12

## 2025-06-13 DIAGNOSIS — I27.21 PULMONARY ARTERIAL HYPERTENSION: ICD-10-CM

## 2025-06-13 RX ORDER — FUROSEMIDE 20 MG/1
40 TABLET ORAL DAILY
Qty: 60 TABLET | Refills: 11 | Status: SHIPPED | OUTPATIENT
Start: 2025-06-13 | End: 2026-06-13

## 2025-07-31 ENCOUNTER — EXTERNAL CHRONIC CARE MANAGEMENT (OUTPATIENT)
Dept: FAMILY MEDICINE | Facility: CLINIC | Age: 65
End: 2025-07-31
Payer: MEDICARE

## 2025-07-31 PROCEDURE — 99490 CHRNC CARE MGMT STAFF 1ST 20: CPT | Mod: ,,, | Performed by: INTERNAL MEDICINE

## 2025-08-29 ENCOUNTER — PATIENT MESSAGE (OUTPATIENT)
Facility: HOSPITAL | Age: 65
End: 2025-08-29
Payer: MEDICARE

## (undated) DEVICE — PROTECTOR ULNAR NERVE FOAM

## (undated) DEVICE — DECANTER FLUID TRNSF WHITE 9IN

## (undated) DEVICE — CLOTH READYPREP 2%CHG 9X10.5IN

## (undated) DEVICE — KIT IV START WITH PREVANTICS

## (undated) DEVICE — GLOVE BIOGEL SKINSENSE PI 7.5

## (undated) DEVICE — ETCO2 NC MICROSTR FEM ST ADLT

## (undated) DEVICE — CHLORAPREP 10.5 ML APPLICATOR

## (undated) DEVICE — CATH SWAN GANZ STND 7FR

## (undated) DEVICE — INTRODUCER KIT MICRO 4FR

## (undated) DEVICE — OXISENSOR ADULT DIGIT N/S

## (undated) DEVICE — CUFF FLEXIPORT BP LONG ADULT

## (undated) DEVICE — LEADWIRE DL DC EKG 10 PIN

## (undated) DEVICE — SET EXTENSION CLEARLINK 2INJ

## (undated) DEVICE — COVER PROBE US GEL BAND

## (undated) DEVICE — GLOVE SENSICARE PI SURG 7

## (undated) DEVICE — DRAPE ANGIO BRACH 38X44IN

## (undated) DEVICE — SHEATH INTRODUCER 7FR 11CM

## (undated) DEVICE — SET EXT CATH NONDEHP .8 6.5IN

## (undated) DEVICE — DRESSING TRANS 4X4 TEGADERM

## (undated) DEVICE — SET IV PRIMARY

## (undated) DEVICE — CATH IV 20G 1.16 IN AUTOGARD

## (undated) DEVICE — Device